# Patient Record
Sex: MALE | Race: WHITE | NOT HISPANIC OR LATINO | Employment: OTHER | URBAN - METROPOLITAN AREA
[De-identification: names, ages, dates, MRNs, and addresses within clinical notes are randomized per-mention and may not be internally consistent; named-entity substitution may affect disease eponyms.]

---

## 2023-09-08 PROBLEM — N52.9 ERECTILE DYSFUNCTION: Status: ACTIVE | Noted: 2023-09-08

## 2023-09-08 PROBLEM — D72.824 BASOPHILIC LEUKOCYTOSIS: Status: ACTIVE | Noted: 2023-09-08

## 2023-09-08 PROBLEM — M51.26 DISPLACEMENT OF LUMBAR INTERVERTEBRAL DISC: Status: ACTIVE | Noted: 2023-09-08

## 2023-09-08 PROBLEM — E55.9 VITAMIN D DEFICIENCY: Status: ACTIVE | Noted: 2023-09-08

## 2023-09-08 PROBLEM — K76.0 HEPATIC STEATOSIS: Status: ACTIVE | Noted: 2023-09-08

## 2023-09-08 PROBLEM — J31.0 CHRONIC RHINITIS: Status: ACTIVE | Noted: 2023-09-08

## 2023-09-08 PROBLEM — K21.9 GASTROESOPHAGEAL REFLUX DISEASE: Status: ACTIVE | Noted: 2023-09-08

## 2023-09-08 PROBLEM — E03.9 HYPOTHYROIDISM: Status: ACTIVE | Noted: 2023-09-08

## 2023-09-08 PROBLEM — M54.16 RADICULOPATHY, LUMBAR REGION: Status: ACTIVE | Noted: 2023-09-08

## 2023-09-08 PROBLEM — M48.062 SPINAL STENOSIS OF LUMBAR REGION WITH NEUROGENIC CLAUDICATION: Status: ACTIVE | Noted: 2023-09-08

## 2023-09-08 PROBLEM — F17.200 SMOKER: Status: ACTIVE | Noted: 2023-09-08

## 2023-09-08 PROBLEM — D72.828 NEUTROPHILIC LEUKOCYTOSIS: Status: ACTIVE | Noted: 2023-09-08

## 2023-09-08 PROBLEM — D72.9 NEUTROPHILIC LEUKOCYTOSIS: Status: ACTIVE | Noted: 2023-09-08

## 2023-09-08 PROBLEM — J44.9 CHRONIC OBSTRUCTIVE PULMONARY DISEASE (MULTI): Status: ACTIVE | Noted: 2023-09-08

## 2023-09-08 PROBLEM — R74.01 TRANSAMINASEMIA: Status: ACTIVE | Noted: 2023-09-08

## 2023-09-08 PROBLEM — D72.19 EOSINOPHILIC LEUKOCYTOSIS: Status: ACTIVE | Noted: 2023-09-08

## 2023-09-08 PROBLEM — J45.909 REACTIVE AIRWAY DISEASE WITHOUT COMPLICATION (HHS-HCC): Status: ACTIVE | Noted: 2023-09-08

## 2023-09-08 PROBLEM — R04.0 EPISTAXIS: Status: ACTIVE | Noted: 2023-09-08

## 2023-09-08 PROBLEM — J43.9: Status: ACTIVE | Noted: 2023-09-08

## 2023-09-08 PROBLEM — M54.12 CERVICAL RADICULITIS: Status: ACTIVE | Noted: 2023-09-08

## 2023-09-08 PROBLEM — E78.00 PURE HYPERCHOLESTEROLEMIA: Status: ACTIVE | Noted: 2023-09-08

## 2023-09-08 RX ORDER — FLUTICASONE PROPIONATE 50 MCG
2 SPRAY, SUSPENSION (ML) NASAL 2 TIMES DAILY
COMMUNITY
End: 2023-11-30 | Stop reason: WASHOUT

## 2023-09-08 RX ORDER — TADALAFIL 20 MG/1
20 TABLET ORAL
COMMUNITY
Start: 2016-03-18 | End: 2024-01-10 | Stop reason: ALTCHOICE

## 2023-09-08 RX ORDER — BUDESONIDE AND FORMOTEROL FUMARATE DIHYDRATE 160; 4.5 UG/1; UG/1
2 AEROSOL RESPIRATORY (INHALATION)
COMMUNITY
Start: 2023-01-23 | End: 2023-11-30 | Stop reason: WASHOUT

## 2023-09-08 RX ORDER — BENZONATATE 200 MG/1
200 CAPSULE ORAL 3 TIMES DAILY PRN
COMMUNITY
End: 2024-01-10 | Stop reason: SDUPTHER

## 2023-09-08 RX ORDER — ALBUTEROL SULFATE 90 UG/1
1-2 AEROSOL, METERED RESPIRATORY (INHALATION)
COMMUNITY
Start: 2020-12-17 | End: 2024-01-10

## 2023-09-08 RX ORDER — SILDENAFIL 100 MG/1
100 TABLET, FILM COATED ORAL DAILY PRN
COMMUNITY
Start: 2023-01-13 | End: 2024-01-10 | Stop reason: SDUPTHER

## 2023-09-08 RX ORDER — CHOLECALCIFEROL (VITAMIN D3) 25 MCG
1 TABLET ORAL DAILY
COMMUNITY

## 2023-09-08 RX ORDER — PROMETHAZINE HYDROCHLORIDE 6.25 MG/5ML
10 SYRUP ORAL EVERY 6 HOURS
COMMUNITY
End: 2023-10-11 | Stop reason: SDUPTHER

## 2023-09-08 RX ORDER — NAPROXEN SODIUM 220 MG
1 TABLET ORAL DAILY PRN
COMMUNITY
End: 2024-04-04 | Stop reason: WASHOUT

## 2023-09-08 RX ORDER — TRETINOIN 0.25 MG/G
CREAM TOPICAL
COMMUNITY
End: 2024-02-09 | Stop reason: WASHOUT

## 2023-09-08 RX ORDER — BENZONATATE 200 MG/1
200 CAPSULE ORAL 2 TIMES DAILY PRN
COMMUNITY
Start: 2023-01-13 | End: 2023-11-30 | Stop reason: WASHOUT

## 2023-09-08 RX ORDER — IBUPROFEN 800 MG/1
800 TABLET ORAL 3 TIMES DAILY PRN
COMMUNITY
End: 2024-04-30 | Stop reason: HOSPADM

## 2023-09-08 RX ORDER — TAVABOROLE TOPICAL SOLUTION, 5% 43.5 MG/ML
1 SOLUTION TOPICAL DAILY
COMMUNITY
End: 2024-01-10 | Stop reason: ALTCHOICE

## 2023-09-08 RX ORDER — ASCORBIC ACID 250 MG
1 TABLET ORAL DAILY
COMMUNITY

## 2023-09-08 RX ORDER — FLUTICASONE FUROATE AND VILANTEROL 100; 25 UG/1; UG/1
1 POWDER RESPIRATORY (INHALATION) DAILY
COMMUNITY
Start: 2023-01-24 | End: 2024-01-10 | Stop reason: SDUPTHER

## 2023-09-08 RX ORDER — FLUTICASONE PROPIONATE 50 MCG
1 SPRAY, SUSPENSION (ML) NASAL DAILY PRN
COMMUNITY
End: 2024-02-09 | Stop reason: WASHOUT

## 2023-09-08 RX ORDER — PROMETHAZINE HYDROCHLORIDE AND PHENYLEPHRINE HYDROCHLORIDE 6.25; 5 MG/5ML; MG/5ML
SYRUP ORAL
COMMUNITY
End: 2024-01-10 | Stop reason: ALTCHOICE

## 2023-09-08 RX ORDER — ACYCLOVIR 50 MG/G
1 OINTMENT TOPICAL
COMMUNITY
End: 2024-02-09 | Stop reason: WASHOUT

## 2023-09-08 RX ORDER — CHOLECALCIFEROL (VITAMIN D3) 125 MCG
CAPSULE ORAL DAILY
COMMUNITY
End: 2023-11-30 | Stop reason: WASHOUT

## 2023-09-08 RX ORDER — SILDENAFIL 100 MG/1
0.5 TABLET, FILM COATED ORAL AS NEEDED
COMMUNITY
End: 2023-11-30 | Stop reason: WASHOUT

## 2023-09-08 RX ORDER — PROMETHAZINE HYDROCHLORIDE 25 MG/ML
1 INJECTION, SOLUTION INTRAMUSCULAR; INTRAVENOUS EVERY 6 HOURS
COMMUNITY
End: 2024-01-10 | Stop reason: SDUPTHER

## 2023-09-08 RX ORDER — ATORVASTATIN CALCIUM 20 MG/1
20 TABLET, FILM COATED ORAL DAILY
COMMUNITY
End: 2024-01-10 | Stop reason: SDUPTHER

## 2023-10-05 NOTE — TELEPHONE ENCOUNTER
Patient came in contact with someone who came in contact with someone who has Hepatitis C. Would like to discuss with clinical staff what he needs to do for testing. Also requesting refill for Promethazine syrup to go to [facility] on [address]. Asked for more than 1 refill.

## 2023-10-05 NOTE — TELEPHONE ENCOUNTER
Patient called back he davian sexual contact with person who tested positive for Hep C and patient has history of fatty liver, would like labs drawn to check on status

## 2023-10-08 DIAGNOSIS — Z20.5 EXPOSURE TO HEPATITIS C: Primary | ICD-10-CM

## 2023-10-08 RX ORDER — PROMETHAZINE HYDROCHLORIDE 6.25 MG/5ML
10 SYRUP ORAL EVERY 6 HOURS
Qty: 120 ML | Refills: 1 | OUTPATIENT
Start: 2023-10-08 | End: 2024-01-06

## 2023-10-09 NOTE — TELEPHONE ENCOUNTER
Patient aware of above, also requesting promethazine syrup for his cough, asking for Rx with  additional refills

## 2023-10-11 DIAGNOSIS — J43.9 CHRONIC EMPHYSEMA SYNDROME (MULTI): Primary | ICD-10-CM

## 2023-10-11 RX ORDER — PROMETHAZINE HYDROCHLORIDE 6.25 MG/5ML
12.5 SYRUP ORAL EVERY 6 HOURS
Qty: 120 ML | Refills: 1 | Status: SHIPPED | OUTPATIENT
Start: 2023-10-11 | End: 2023-11-29

## 2023-10-16 ENCOUNTER — LAB (OUTPATIENT)
Dept: LAB | Facility: LAB | Age: 63
End: 2023-10-16
Payer: MEDICARE

## 2023-10-16 ENCOUNTER — CLINICAL SUPPORT (OUTPATIENT)
Dept: PRIMARY CARE | Facility: CLINIC | Age: 63
End: 2023-10-16
Payer: MEDICARE

## 2023-10-16 VITALS — TEMPERATURE: 97.8 F

## 2023-10-16 DIAGNOSIS — Z20.5 EXPOSURE TO HEPATITIS C: ICD-10-CM

## 2023-10-16 DIAGNOSIS — Z23 ENCOUNTER FOR IMMUNIZATION: ICD-10-CM

## 2023-10-16 LAB
ALBUMIN SERPL-MCNC: 4.7 G/DL (ref 3.5–5)
ALP BLD-CCNC: 109 U/L (ref 35–125)
ALT SERPL-CCNC: 24 U/L (ref 5–40)
AST SERPL-CCNC: 17 U/L (ref 5–40)
BILIRUB DIRECT SERPL-MCNC: <0.2 MG/DL (ref 0–0.2)
BILIRUB SERPL-MCNC: 0.4 MG/DL (ref 0.1–1.2)
PROT SERPL-MCNC: 6.7 G/DL (ref 5.9–7.9)

## 2023-10-16 PROCEDURE — 80076 HEPATIC FUNCTION PANEL: CPT

## 2023-10-16 PROCEDURE — 87522 HEPATITIS C REVRS TRNSCRPJ: CPT

## 2023-10-16 PROCEDURE — 86803 HEPATITIS C AB TEST: CPT

## 2023-10-16 PROCEDURE — G0008 ADMIN INFLUENZA VIRUS VAC: HCPCS | Performed by: INTERNAL MEDICINE

## 2023-10-16 PROCEDURE — 90662 IIV NO PRSV INCREASED AG IM: CPT | Performed by: INTERNAL MEDICINE

## 2023-10-16 PROCEDURE — 36415 COLL VENOUS BLD VENIPUNCTURE: CPT

## 2023-10-17 LAB
HCV AB SER QL: NONREACTIVE
HCV RNA SERPL NAA+PROBE-ACNC: NOT DETECTED K[IU]/ML
HCV RNA SERPL NAA+PROBE-LOG IU: NORMAL {LOG_IU}/ML

## 2023-10-18 ENCOUNTER — TELEPHONE (OUTPATIENT)
Dept: PRIMARY CARE | Facility: CLINIC | Age: 63
End: 2023-10-18

## 2023-10-18 NOTE — TELEPHONE ENCOUNTER
Pt requesting results of recent labs and further instructions if any.  Please advise.  Ph: [phone]

## 2023-10-19 NOTE — TELEPHONE ENCOUNTER
Patient would  like to have lab order to to repeat hep and hepatic panel in 6 months, please send  message back after ordered.

## 2023-11-28 DIAGNOSIS — J43.9 CHRONIC EMPHYSEMA SYNDROME (MULTI): ICD-10-CM

## 2023-11-29 RX ORDER — PROMETHAZINE HYDROCHLORIDE 6.25 MG/5ML
SYRUP ORAL
Qty: 360 ML | Refills: 1 | Status: ON HOLD | OUTPATIENT
Start: 2023-11-29 | End: 2024-04-26

## 2023-11-30 ENCOUNTER — OFFICE VISIT (OUTPATIENT)
Dept: PRIMARY CARE | Facility: CLINIC | Age: 63
End: 2023-11-30
Payer: MEDICARE

## 2023-11-30 VITALS
WEIGHT: 211 LBS | TEMPERATURE: 97.9 F | BODY MASS INDEX: 30.21 KG/M2 | OXYGEN SATURATION: 97 % | HEIGHT: 70 IN | SYSTOLIC BLOOD PRESSURE: 152 MMHG | DIASTOLIC BLOOD PRESSURE: 84 MMHG | HEART RATE: 111 BPM

## 2023-11-30 DIAGNOSIS — Z12.2 ENCOUNTER FOR SCREENING FOR LUNG CANCER: ICD-10-CM

## 2023-11-30 DIAGNOSIS — Z87.891 PERSONAL HISTORY OF NICOTINE DEPENDENCE: ICD-10-CM

## 2023-11-30 DIAGNOSIS — J98.09 BRONCHOSPASTIC AIRWAY DISEASE: Primary | ICD-10-CM

## 2023-11-30 DIAGNOSIS — Z72.0 TOBACCO USE: ICD-10-CM

## 2023-11-30 PROCEDURE — 99213 OFFICE O/P EST LOW 20 MIN: CPT | Performed by: INTERNAL MEDICINE

## 2023-11-30 PROCEDURE — 1126F AMNT PAIN NOTED NONE PRSNT: CPT | Performed by: INTERNAL MEDICINE

## 2023-11-30 RX ORDER — PROMETHAZINE HYDROCHLORIDE AND DEXTROMETHORPHAN HYDROBROMIDE 6.25; 15 MG/5ML; MG/5ML
SYRUP ORAL
COMMUNITY
Start: 2023-07-06 | End: 2024-01-10 | Stop reason: ALTCHOICE

## 2023-11-30 RX ORDER — FAMCICLOVIR 125 MG/1
TABLET ORAL EVERY 12 HOURS
COMMUNITY
End: 2024-01-10 | Stop reason: SDUPTHER

## 2023-11-30 RX ORDER — NAPROXEN 500 MG/1
TABLET, DELAYED RELEASE ORAL
COMMUNITY
Start: 2023-11-23 | End: 2024-02-09 | Stop reason: WASHOUT

## 2023-11-30 RX ORDER — AMOXICILLIN 250 MG/1
250 CAPSULE ORAL DAILY PRN
COMMUNITY
Start: 2023-10-01 | End: 2024-03-21 | Stop reason: ALTCHOICE

## 2023-11-30 RX ORDER — METHYLPREDNISOLONE 4 MG/1
TABLET ORAL
Qty: 21 TABLET | Refills: 0 | Status: SHIPPED | OUTPATIENT
Start: 2023-11-30 | End: 2023-12-07

## 2023-11-30 ASSESSMENT — PATIENT HEALTH QUESTIONNAIRE - PHQ9
1. LITTLE INTEREST OR PLEASURE IN DOING THINGS: NOT AT ALL
SUM OF ALL RESPONSES TO PHQ9 QUESTIONS 1 AND 2: 0
2. FEELING DOWN, DEPRESSED OR HOPELESS: NOT AT ALL

## 2023-11-30 ASSESSMENT — PAIN SCALES - GENERAL: PAINLEVEL: 0-NO PAIN

## 2023-11-30 ASSESSMENT — ENCOUNTER SYMPTOMS
LOSS OF SENSATION IN FEET: 0
DEPRESSION: 0
OCCASIONAL FEELINGS OF UNSTEADINESS: 0

## 2023-11-30 NOTE — PROGRESS NOTES
[facility]: [facility]  PROGRESS NOTE      Gregor Finney is a 63 y.o. male that is presenting today for Cough.    Assessment/Plan   Diagnoses and all orders for this visit:  Bronchospastic airway disease  -     methylPREDNISolone (Medrol Dospak) 4 mg tablets; Take as directed on package.  Tobacco use  -     CT lung screening low dose; Future  Encounter for screening for lung cancer  -     CT lung screening low dose; Future  Personal history of nicotine dependence  -     CT lung screening low dose; Future  Subjective   HPIPatient is here fro 4 weeks H/O cough and his wife who started before him with the URI and lingering cough was Dxed with RSV.  Review of Systems   All pertinent POSITIVES as noted per HPI.  All other systems have been reviewed and are NEGATIVE and /or Noncontributory to this patient current visit or complaint.  Objective   Vitals:    11/30/23 0948   BP: 152/84   Pulse: (!) 111   Temp: 36.6 °C (97.9 °F)   SpO2: 97%      Body mass index is 30.28 kg/m².  Physical Exam  Vitals and nursing note reviewed.   Constitutional:       Appearance: Normal appearance.   HENT:      Head: Normocephalic and atraumatic.   Neck:      Vascular: No carotid bruit.   Cardiovascular:      Rate and Rhythm: Normal rate and regular rhythm.      Pulses: Normal pulses.      Heart sounds: Normal heart sounds.   Pulmonary:      Effort: Pulmonary effort is normal. No respiratory distress.      Breath sounds: No stridor. Wheezing (Expiratory) and rhonchi (leared with coughing) present. No rales.   Chest:      Chest wall: No tenderness.   Abdominal:      General: Abdomen is flat. Bowel sounds are normal.      Palpations: Abdomen is soft.   Musculoskeletal:         General: No swelling. Normal range of motion.      Cervical back: Neck supple.   Lymphadenopathy:      Cervical: No cervical adenopathy.   Skin:     General: Skin is warm and dry.   Neurological:      Mental Status: He is alert.      Cranial  "Nerves: Cranial nerve deficit present.   Psychiatric:         Mood and Affect: Mood normal.     Diagnostic Results   Lab Results   Component Value Date    GLUCOSE 108 (H) 07/11/2023    CALCIUM 9.6 07/11/2023     07/11/2023    K 4.6 07/11/2023    CO2 23 (L) 07/11/2023     07/11/2023    BUN 21 07/11/2023    CREATININE 1.2 07/11/2023     Lab Results   Component Value Date    ALT 24 10/16/2023    AST 17 10/16/2023    ALKPHOS 109 10/16/2023    BILITOT 0.4 10/16/2023     Lab Results   Component Value Date    WBC 18.2 (H) 03/09/2023    HGB 15.6 03/09/2023    HCT 46.5 03/09/2023    MCV 95 03/09/2023     03/09/2023     Lab Results   Component Value Date    CHOL 150 07/11/2023     Lab Results   Component Value Date    HDL 33 (L) 07/11/2023     Lab Results   Component Value Date    LDLCALC 85 07/11/2023     Lab Results   Component Value Date    TRIG 160 (H) 07/11/2023     No components found for: \"CHOLHDL\"  Lab Results   Component Value Date    HGBA1C 5.8 07/11/2023     Other labs not included in the list above were reviewed either before or during this encounter.    History    Past Medical History:   Diagnosis Date    Personal history of other endocrine, nutritional and metabolic disease     History of hypercholesterolemia     Past Surgical History:   Procedure Laterality Date    OTHER SURGICAL HISTORY  09/16/2019    Nasal septoplasty    OTHER SURGICAL HISTORY  09/16/2019    Knee surgery    OTHER SURGICAL HISTORY  09/16/2019    Elbow surgery     Family History   Problem Relation Name Age of Onset    COPD Mother      Emphysema Mother      Stroke Other      Diabetes Other      Cancer Other      Heart attack Other       Social History     Socioeconomic History    Marital status:      Spouse name: Not on file    Number of children: Not on file    Years of education: Not on file    Highest education level: Not on file   Occupational History    Not on file   Tobacco Use    Smoking status: Not on file    " Smokeless tobacco: Current   Substance and Sexual Activity    Alcohol use: Not on file    Drug use: Never    Sexual activity: Never   Other Topics Concern    Not on file   Social History Narrative    Not on file     Social Determinants of Health     Financial Resource Strain: Not on file   Food Insecurity: Not on file   Transportation Needs: Not on file   Physical Activity: Not on file   Stress: Not on file   Social Connections: Not on file   Intimate Partner Violence: Not on file   Housing Stability: Not on file     Allergies   Allergen Reactions    Levofloxacin Other     foot, knee pain    Sulfa (Sulfonamide Antibiotics) Rash    Tetracycline Rash     Current Outpatient Medications on File Prior to Visit   Medication Sig Dispense Refill    acyclovir (Zovirax) 5 % ointment Apply 1 Application topically 6 times a day. 1 application every 3 hours Externally Six times a day      albuterol 90 mcg/actuation inhaler Inhale 1-2 puffs.  INHALE 1 TO 2 PUFFS EVERY 4 TO 6 HOURS AS NEEDED.      amoxicillin (Amoxil) 250 mg capsule Take 1 capsule (250 mg) by mouth once daily as needed.      ascorbic acid (Vitamin C) 250 mg tablet Take 1 tablet (250 mg) by mouth once daily. TAKE 1 TABLET DAILY.      atorvastatin (Lipitor) 20 mg tablet Take 1 tablet (20 mg) by mouth once daily.  1 tablet Orally Once a day for 90 day(s)      benzonatate (Tessalon) 200 mg capsule Take 1 capsule (200 mg) by mouth 3 times a day as needed.  TAKE 1 CAPSULE 3 TIMES DAILY AS NEEDED.      calcium carb-vitamin D3-vit K2 600 mg-1,000 unit-90 mcg tablet 1 capsule once every 24 hours.      cholecalciferol (Vitamin D3) 25 MCG (1000 UT) tablet Take 1 capsule by mouth once daily.  1 capsule Orally Once a day for 30 day(s)      EC-Naproxen 500 mg EC tablet       famciclovir (Famvir) 125 mg tablet every 12 hours.      fluticasone (Flonase) 50 mcg/actuation nasal spray Administer 1 spray into each nostril once daily as needed. 1 spray in each nostril Nasally Once a  day as needed for 90 days      fluticasone furoate-vilanteroL (Breo Ellipta) 100-25 mcg/dose inhaler Inhale 1 puff once daily.  1 puff Inhalation Once a day for 90 days      ibuprofen 800 mg tablet Take 1 tablet (800 mg) by mouth 3 times a day as needed.  1 tablet Orally Three times a day as needed( not every day) for 30 days      naproxen sodium (Aleve) 220 mg tablet Take 1 tablet (220 mg) by mouth once daily as needed.  ONE TABLET DAILY PRN      promethazine-DM (Phenergan-DM) 6.25-15 mg/5 mL syrup TAKE 5-10 ML AS NEEDED ORALLY EVERY 6 HOURS      promethazine-phenylephrine (Phenergan-VC) 6.25-5 mg/5 mL syrup Take by mouth.      sildenafil (Viagra) 100 mg tablet Take 1 tablet (100 mg) by mouth once daily as needed.  1 tablet as needed Orally Once a day as needed for 90 day(s)      ZINC GLUCONATE ORAL Take 1 tablet by mouth once daily.  TAKE 1 TABLET DAILY.      [DISCONTINUED] budesonide-formoteroL (Symbicort) 160-4.5 mcg/actuation inhaler Inhale 2 puffs 2 times a day.  2 puffs Inhalation Twice a day for 90 days      [DISCONTINUED] cholecalciferol (Vitamin D-3) 125 MCG (5000 UT) capsule Take by mouth once daily.  one capsule daily      [DISCONTINUED] MULTIVITAMIN ORAL Take by mouth.  as directed Orally      [DISCONTINUED] sildenafil (Viagra) 100 mg tablet Take 0.5 tablets (50 mg) by mouth if needed.  1/2 tablet PRN      [DISCONTINUED] vitamin E acetate (VITAMIN E ORAL) Take 1 capsule by mouth early in the morning..      alpha lipoic acid (LIPOIC ACID ORAL) Take 1 capsule by mouth early in the morning..  ONE DOSEAGE DAILY      promethazine (Phenergan) 25 mg/mL injection Inject 1 mL (25 mg) into the shoulder, thigh, or buttocks every 6 hours.  1 mL as needed Injection every 6 hrs      promethazine (Phenergan) 6.25 mg/5 mL syrup TAKE 1 MILLILITER BY MOUTH EVERY 6 HOURS AS NEEDED 360 mL 1    tadalafil (Cialis) 20 mg tablet Take 1 tablet (20 mg) by mouth.  1 tablet Orally every 36 hrs prn for 30 day(s)      tavaborole 5 %  solution with applicator 1 Application early in the morning..  1 application Externally Once a day      tretinoin (Retin-A) 0.025 % cream Retin-A 0.025 % External Cream   Refills: 0       Active      triamcinolone acetonide (KENALOG) 40 mg/mL kit as directed Combination      [DISCONTINUED] benzonatate (Tessalon) 200 mg capsule Take 1 capsule (200 mg) by mouth 2 times a day as needed.  1 capsule Orally Two times a day for 90 day(s)      [DISCONTINUED] fluticasone (Flonase) 50 mcg/actuation nasal spray Administer 2 sprays into each nostril 2 times a day.  USE 2 SPRAYS IN EACH NOSTRIL TWICE DAILY.      [DISCONTINUED] promethazine (Phenergan) 6.25 mg/5 mL syrup Take 10 mL (12.5 mg) by mouth every 6 hours. 120 mL 1     No current facility-administered medications on file prior to visit.     Immunization History   Administered Date(s) Administered    DTaP vaccine, pediatric  (INFANRIX) 03/28/2012    Flu vaccine (IIV4), preservative free *Check age/dose* 10/12/2020, 10/06/2021, 09/20/2022    Flu vaccine, quadrivalent, high-dose, preservative free, age 65y+ (FLUZONE) 10/16/2023    Hepatitis A vaccine, age 19 years and greater (HAVRIX) 09/03/2021    Hepatitis B vaccine, adult (RECOMBIVAX, ENGERIX) 09/03/2021, 11/10/2021    Moderna COVID-19 vaccine, bivalent, blue cap/gray label *Check age/dose* 11/18/2022    Moderna SARS-CoV-2 Vaccination 04/07/2021, 05/07/2021, 01/17/2022, 07/16/2022    Pneumococcal conjugate vaccine, 13-valent (PREVNAR 13) 11/19/2019    Pneumococcal polysaccharide vaccine, 23-valent, age 2 years and older (PNEUMOVAX 23) 10/02/2018    Td vaccine, age 7 years and older (TDVAX) 01/01/2000    Zoster vaccine, recombinant, adult (SHINGRIX) 11/19/2019     Patient's medical history was reviewed and updated either before or during this encounter.       Pardeep Max MD

## 2023-12-04 ENCOUNTER — TELEPHONE (OUTPATIENT)
Dept: PRIMARY CARE | Facility: CLINIC | Age: 63
End: 2023-12-04
Payer: COMMERCIAL

## 2023-12-04 DIAGNOSIS — K64.4 INFLAMED EXTERNAL HEMORRHOID: Primary | ICD-10-CM

## 2023-12-04 RX ORDER — PRAMOXINE HYDROCHLORIDE HYDROCORTISONE ACETATE 100; 100 MG/10G; MG/10G
1 AEROSOL, FOAM TOPICAL 3 TIMES DAILY
Qty: 45 G | Refills: 0 | Status: SHIPPED | OUTPATIENT
Start: 2023-12-04 | End: 2024-01-10 | Stop reason: ALTCHOICE

## 2023-12-04 NOTE — TELEPHONE ENCOUNTER
Pt went to  cream (proctofoam) but states it cost $990.  Asking if there is something similar that would be less expensive.  Please advise.    Send to [facility] [address], [place]

## 2023-12-04 NOTE — TELEPHONE ENCOUNTER
Pt states you had wanted a call re: severely inflamed hemorrhoidal tissue.  States you would Rx a cream if not better and pt states not better.  Would like cream sent to [facility] [address], [place].  Please advise.  Ph:  [phone]

## 2023-12-05 DIAGNOSIS — K64.8 INFLAMED INTERNAL HEMORRHOID: Primary | ICD-10-CM

## 2023-12-05 DIAGNOSIS — K64.4 INFLAMED EXTERNAL HEMORRHOID: ICD-10-CM

## 2023-12-05 RX ORDER — HYDROCORTISONE ACETATE 25 MG/1
25 SUPPOSITORY RECTAL 2 TIMES DAILY PRN
Qty: 30 SUPPOSITORY | Refills: 0 | Status: SHIPPED | OUTPATIENT
Start: 2023-12-05 | End: 2024-01-10 | Stop reason: ALTCHOICE

## 2023-12-05 NOTE — TELEPHONE ENCOUNTER
Can you assist with pricing and other options?  Pt called again and is very uncomfortable.  Please advise.

## 2023-12-07 ENCOUNTER — OFFICE VISIT (OUTPATIENT)
Dept: PRIMARY CARE | Facility: CLINIC | Age: 63
End: 2023-12-07
Payer: MEDICARE

## 2023-12-07 VITALS
SYSTOLIC BLOOD PRESSURE: 124 MMHG | BODY MASS INDEX: 29.7 KG/M2 | HEART RATE: 117 BPM | DIASTOLIC BLOOD PRESSURE: 80 MMHG | WEIGHT: 207 LBS | OXYGEN SATURATION: 95 %

## 2023-12-07 DIAGNOSIS — K64.4 INFLAMED EXTERNAL HEMORRHOID: Primary | ICD-10-CM

## 2023-12-07 PROCEDURE — 4004F PT TOBACCO SCREEN RCVD TLK: CPT | Performed by: INTERNAL MEDICINE

## 2023-12-07 PROCEDURE — 99213 OFFICE O/P EST LOW 20 MIN: CPT | Performed by: INTERNAL MEDICINE

## 2023-12-07 PROCEDURE — 1159F MED LIST DOCD IN RCRD: CPT | Performed by: INTERNAL MEDICINE

## 2023-12-07 PROCEDURE — 1125F AMNT PAIN NOTED PAIN PRSNT: CPT | Performed by: INTERNAL MEDICINE

## 2023-12-07 ASSESSMENT — PATIENT HEALTH QUESTIONNAIRE - PHQ9
1. LITTLE INTEREST OR PLEASURE IN DOING THINGS: NOT AT ALL
2. FEELING DOWN, DEPRESSED OR HOPELESS: NOT AT ALL
SUM OF ALL RESPONSES TO PHQ9 QUESTIONS 1 AND 2: 0

## 2023-12-07 ASSESSMENT — ENCOUNTER SYMPTOMS
OCCASIONAL FEELINGS OF UNSTEADINESS: 0
DEPRESSION: 0
LOSS OF SENSATION IN FEET: 1

## 2023-12-07 ASSESSMENT — PAIN SCALES - GENERAL: PAINLEVEL: 6

## 2023-12-07 NOTE — PROGRESS NOTES
[facility]: [facility]  PROGRESS NOTE      Gregor Finney is a 63 y.o. male that is presenting today for Follow-up (Hemorrhoids ).    Assessment/Plan   Diagnoses and all orders for this visit:  Inflamed external huge hemorrhoid    Local Tx is ineffective  -     Referral to Colorectal Surgery; Future  Subjective   Patient is here fro [facility], been having problem with hs hemorrhoid that started couple of weeks ago after bad constipation followed by diarrhea. All OTC and prescription Tx been ineffective and he is in extreme pain denies any bleeding.    Review of Systems   All pertinent POSITIVES as noted per HPI.  All other systems have been reviewed and are NEGATIVE and /or Noncontributory to this patient current visit or complaint.  Objective   Vitals:    12/07/23 1628   BP: 124/80   Pulse: (!) 117   SpO2: 95%      Body mass index is 29.7 kg/m².  Physical Exam  Vitals and nursing note reviewed.   Constitutional:       Appearance: Normal appearance.   HENT:      Head: Normocephalic and atraumatic.   Neck:      Vascular: No carotid bruit.   Cardiovascular:      Rate and Rhythm: Normal rate and regular rhythm.      Pulses: Normal pulses.      Heart sounds: Normal heart sounds.   Pulmonary:      Effort: Pulmonary effort is normal.      Breath sounds: Normal breath sounds.   Abdominal:      General: Abdomen is flat. Bowel sounds are normal.      Palpations: Abdomen is soft.   Genitourinary:     Comments: Extremely large and inflamed external hemorrhoid , no internal   Musculoskeletal:         General: No swelling. Normal range of motion.      Cervical back: Neck supple.   Lymphadenopathy:      Cervical: No cervical adenopathy.   Skin:     General: Skin is warm and dry.   Neurological:      Mental Status: He is alert.   Psychiatric:         Mood and Affect: Mood normal.       Diagnostic Results   Lab Results   Component Value Date    GLUCOSE 108 (H) 07/11/2023    CALCIUM 9.6 07/11/2023      "07/11/2023    K 4.6 07/11/2023    CO2 23 (L) 07/11/2023     07/11/2023    BUN 21 07/11/2023    CREATININE 1.2 07/11/2023     Lab Results   Component Value Date    ALT 24 10/16/2023    AST 17 10/16/2023    ALKPHOS 109 10/16/2023    BILITOT 0.4 10/16/2023     Lab Results   Component Value Date    WBC 18.2 (H) 03/09/2023    HGB 15.6 03/09/2023    HCT 46.5 03/09/2023    MCV 95 03/09/2023     03/09/2023     Lab Results   Component Value Date    CHOL 150 07/11/2023     Lab Results   Component Value Date    HDL 33 (L) 07/11/2023     Lab Results   Component Value Date    LDLCALC 85 07/11/2023     Lab Results   Component Value Date    TRIG 160 (H) 07/11/2023     No components found for: \"CHOLHDL\"  Lab Results   Component Value Date    HGBA1C 5.8 07/11/2023     Other labs not included in the list above were reviewed either before or during this encounter.    History    Past Medical History:   Diagnosis Date    Personal history of other endocrine, nutritional and metabolic disease     History of hypercholesterolemia     Past Surgical History:   Procedure Laterality Date    OTHER SURGICAL HISTORY  09/16/2019    Nasal septoplasty    OTHER SURGICAL HISTORY  09/16/2019    Knee surgery    OTHER SURGICAL HISTORY  09/16/2019    Elbow surgery     Family History   Problem Relation Name Age of Onset    COPD Mother      Emphysema Mother      Stroke Other      Diabetes Other      Cancer Other      Heart attack Other       Social History     Socioeconomic History    Marital status:      Spouse name: Not on file    Number of children: Not on file    Years of education: Not on file    Highest education level: Not on file   Occupational History    Not on file   Tobacco Use    Smoking status: Every Day     Packs/day: 1     Types: Cigarettes    Smokeless tobacco: Never   Vaping Use    Vaping Use: Never used   Substance and Sexual Activity    Alcohol use: Yes     Comment: sometimes    Drug use: Never    Sexual activity: " Never   Other Topics Concern    Not on file   Social History Narrative    Not on file     Social Determinants of Health     Financial Resource Strain: Not on file   Food Insecurity: Not on file   Transportation Needs: Not on file   Physical Activity: Not on file   Stress: Not on file   Social Connections: Not on file   Intimate Partner Violence: Not on file   Housing Stability: Not on file     Allergies   Allergen Reactions    Levofloxacin Other     foot, knee pain    Sulfa (Sulfonamide Antibiotics) Rash    Tetracycline Rash     Current Outpatient Medications on File Prior to Visit   Medication Sig Dispense Refill    acyclovir (Zovirax) 5 % ointment Apply 1 Application topically 6 times a day. 1 application every 3 hours Externally Six times a day      albuterol 90 mcg/actuation inhaler Inhale 1-2 puffs.  INHALE 1 TO 2 PUFFS EVERY 4 TO 6 HOURS AS NEEDED.      alpha lipoic acid (LIPOIC ACID ORAL) Take 1 capsule by mouth early in the morning..  ONE DOSEAGE DAILY      amoxicillin (Amoxil) 250 mg capsule Take 1 capsule (250 mg) by mouth once daily as needed.      ascorbic acid (Vitamin C) 250 mg tablet Take 1 tablet (250 mg) by mouth once daily. TAKE 1 TABLET DAILY.      atorvastatin (Lipitor) 20 mg tablet Take 1 tablet (20 mg) by mouth once daily.  1 tablet Orally Once a day for 90 day(s)      benzonatate (Tessalon) 200 mg capsule Take 1 capsule (200 mg) by mouth 3 times a day as needed.  TAKE 1 CAPSULE 3 TIMES DAILY AS NEEDED.      calcium carb-vitamin D3-vit K2 600 mg-1,000 unit-90 mcg tablet 1 capsule once every 24 hours.      cholecalciferol (Vitamin D3) 25 MCG (1000 UT) tablet Take 1 capsule by mouth once daily.  1 capsule Orally Once a day for 30 day(s)      EC-Naproxen 500 mg EC tablet       famciclovir (Famvir) 125 mg tablet every 12 hours.      fluticasone (Flonase) 50 mcg/actuation nasal spray Administer 1 spray into each nostril once daily as needed. 1 spray in each nostril Nasally Once a day as needed for  90 days      fluticasone furoate-vilanteroL (Breo Ellipta) 100-25 mcg/dose inhaler Inhale 1 puff once daily.  1 puff Inhalation Once a day for 90 days      hydrocortisone (Anusol-HC) 25 mg suppository Insert 1 suppository (25 mg) into the rectum 2 times a day as needed for hemorrhoids. 30 suppository 0    hydrocortisone-pramoxine (Proctofoam HC) rectal foam Insert 1 applicator into the rectum 3 times a day. 45 g 0    ibuprofen 800 mg tablet Take 1 tablet (800 mg) by mouth 3 times a day as needed.  1 tablet Orally Three times a day as needed( not every day) for 30 days      methylPREDNISolone (Medrol Dospak) 4 mg tablets Take as directed on package. 21 tablet 0    naproxen sodium (Aleve) 220 mg tablet Take 1 tablet (220 mg) by mouth once daily as needed.  ONE TABLET DAILY PRN      promethazine (Phenergan) 25 mg/mL injection Inject 1 mL (25 mg) into the muscle every 6 hours.  1 mL as needed Injection every 6 hrs      promethazine (Phenergan) 6.25 mg/5 mL syrup TAKE 1 MILLILITER BY MOUTH EVERY 6 HOURS AS NEEDED 360 mL 1    promethazine-DM (Phenergan-DM) 6.25-15 mg/5 mL syrup TAKE 5-10 ML AS NEEDED ORALLY EVERY 6 HOURS      promethazine-phenylephrine (Phenergan-VC) 6.25-5 mg/5 mL syrup Take by mouth.      sildenafil (Viagra) 100 mg tablet Take 1 tablet (100 mg) by mouth once daily as needed.  1 tablet as needed Orally Once a day as needed for 90 day(s)      tadalafil (Cialis) 20 mg tablet Take 1 tablet (20 mg) by mouth.  1 tablet Orally every 36 hrs prn for 30 day(s)      tavaborole 5 % solution with applicator 1 Application early in the morning..  1 application Externally Once a day      tretinoin (Retin-A) 0.025 % cream Retin-A 0.025 % External Cream   Refills: 0       Active      triamcinolone acetonide (KENALOG) 40 mg/mL kit as directed Combination      ZINC GLUCONATE ORAL Take 1 tablet by mouth once daily.  TAKE 1 TABLET DAILY.       No current facility-administered medications on file prior to visit.      Immunization History   Administered Date(s) Administered    DTaP vaccine, pediatric  (INFANRIX) 03/28/2012    Flu vaccine (IIV4), preservative free *Check age/dose* 10/12/2020, 10/06/2021, 09/20/2022    Flu vaccine, quadrivalent, high-dose, preservative free, age 65y+ (FLUZONE) 10/16/2023    Hepatitis A vaccine, age 19 years and greater (HAVRIX) 09/03/2021    Hepatitis B vaccine, adult (RECOMBIVAX, ENGERIX) 09/03/2021, 11/10/2021    Moderna COVID-19 vaccine, bivalent, blue cap/gray label *Check age/dose* 11/18/2022    Moderna SARS-CoV-2 Vaccination 04/07/2021, 05/07/2021, 01/17/2022, 07/16/2022    Pneumococcal conjugate vaccine, 13-valent (PREVNAR 13) 11/19/2019    Pneumococcal polysaccharide vaccine, 23-valent, age 2 years and older (PNEUMOVAX 23) 10/02/2018    Td vaccine, age 7 years and older (TDVAX) 01/01/2000    Zoster vaccine, recombinant, adult (SHINGRIX) 11/19/2019     Patient's medical history was reviewed and updated either before or during this encounter.       Pardeep Max MD

## 2023-12-15 ENCOUNTER — OFFICE VISIT (OUTPATIENT)
Dept: SURGERY | Facility: CLINIC | Age: 63
End: 2023-12-15
Payer: MEDICARE

## 2023-12-15 VITALS
RESPIRATION RATE: 17 BRPM | BODY MASS INDEX: 29.63 KG/M2 | SYSTOLIC BLOOD PRESSURE: 147 MMHG | WEIGHT: 207 LBS | DIASTOLIC BLOOD PRESSURE: 79 MMHG | TEMPERATURE: 98.1 F | HEIGHT: 70 IN | HEART RATE: 116 BPM

## 2023-12-15 DIAGNOSIS — K64.5 THROMBOSED EXTERNAL HEMORRHOID: Primary | ICD-10-CM

## 2023-12-15 DIAGNOSIS — K64.4 INFLAMED EXTERNAL HEMORRHOID: ICD-10-CM

## 2023-12-15 PROCEDURE — 1126F AMNT PAIN NOTED NONE PRSNT: CPT | Performed by: SURGERY

## 2023-12-15 PROCEDURE — 1159F MED LIST DOCD IN RCRD: CPT | Performed by: SURGERY

## 2023-12-15 PROCEDURE — 99203 OFFICE O/P NEW LOW 30 MIN: CPT | Performed by: SURGERY

## 2023-12-15 PROCEDURE — 99213 OFFICE O/P EST LOW 20 MIN: CPT | Performed by: SURGERY

## 2023-12-15 PROCEDURE — 4004F PT TOBACCO SCREEN RCVD TLK: CPT | Performed by: SURGERY

## 2023-12-15 ASSESSMENT — PATIENT HEALTH QUESTIONNAIRE - PHQ9
2. FEELING DOWN, DEPRESSED OR HOPELESS: NOT AT ALL
1. LITTLE INTEREST OR PLEASURE IN DOING THINGS: NOT AT ALL
SUM OF ALL RESPONSES TO PHQ9 QUESTIONS 1 & 2: 0

## 2023-12-15 ASSESSMENT — LIFESTYLE VARIABLES
HOW OFTEN DO YOU HAVE A DRINK CONTAINING ALCOHOL: MONTHLY OR LESS
HOW OFTEN DO YOU HAVE SIX OR MORE DRINKS ON ONE OCCASION: NEVER
AUDIT-C TOTAL SCORE: 1
SKIP TO QUESTIONS 9-10: 1
HOW MANY STANDARD DRINKS CONTAINING ALCOHOL DO YOU HAVE ON A TYPICAL DAY: 1 OR 2

## 2023-12-15 ASSESSMENT — PAIN SCALES - GENERAL: PAINLEVEL: 0-NO PAIN

## 2023-12-15 ASSESSMENT — COLUMBIA-SUICIDE SEVERITY RATING SCALE - C-SSRS
6. HAVE YOU EVER DONE ANYTHING, STARTED TO DO ANYTHING, OR PREPARED TO DO ANYTHING TO END YOUR LIFE?: NO
1. IN THE PAST MONTH, HAVE YOU WISHED YOU WERE DEAD OR WISHED YOU COULD GO TO SLEEP AND NOT WAKE UP?: NO
2. HAVE YOU ACTUALLY HAD ANY THOUGHTS OF KILLING YOURSELF?: NO

## 2023-12-15 ASSESSMENT — ENCOUNTER SYMPTOMS
DEPRESSION: 0
LOSS OF SENSATION IN FEET: 0
OCCASIONAL FEELINGS OF UNSTEADINESS: 0

## 2023-12-15 NOTE — PROGRESS NOTES
[facility]: GENERAL SURGERY  PROGRESS NOTE      Gregor Finney is a 63 y.o. male that is presenting today for New Patient Visit (Dr. Max referred for external hemorrhoids).    ASSESSMENT / PLAN:  Diagnoses and all orders for this visit:  Thrombosed external hemorrhoid  Present for a little over 1 week and slowly improving.  Would not benefit from excision in the office today.  Discussed fiber, sitz bath's, topical nitroglycerin to help expedite resolution.  Patient Active Problem List   Diagnosis    Chronic rhinitis    Cervical radiculitis    Displacement of lumbar intervertebral disc    Basophilic leukocytosis    Eosinophilic leukocytosis    Epistaxis    Erectile dysfunction    Gastroesophageal reflux disease    Hepatic steatosis    Hypothyroidism    Neutrophilic leukocytosis    Chronic obstructive pulmonary disease (CMS/HCC)    Chronic emphysema syndrome (CMS/HCC)    Pure hypercholesterolemia    Reactive airway disease without complication    Smoker    Radiculopathy, lumbar region    Spinal stenosis of lumbar region with neurogenic claudication    Transaminasemia    Vitamin D deficiency    Thrombosed external hemorrhoid     Subjective   Symptoms started a few days before pcp visit, slowly resolving but worsened with enema. Has had blood in stool in past but not recent. Last scope about 6 years ago.  Feels a fullness and pressure near the anus.  Review of Systems   All other systems reviewed and are negative.     Objective   Vitals:    12/15/23 1137   BP: 147/79   Pulse: (!) 116   Resp: 17   Temp: 36.7 °C (98.1 °F)      Physical Exam  Constitutional:       Appearance: Normal appearance.   HENT:      Head: Normocephalic.   Eyes:      Pupils: Pupils are equal, round, and reactive to light.   Cardiovascular:      Rate and Rhythm: Normal rate.   Pulmonary:      Effort: Pulmonary effort is normal.   Abdominal:      General: Abdomen is flat. Bowel sounds are normal.      Palpations: Abdomen is soft.  "  Genitourinary:     Comments: The patient was placed in prone Kraske position. Perianal skin was examined without abnormality.  Approximately 3 cm thrombosed external hemorrhoid in the left posterior quadrant feels relatively soft on exam.  Digital exam deferred.  Skin:     General: Skin is warm.   Neurological:      General: No focal deficit present.      Mental Status: He is alert.         Diagnostic Results   Lab Results   Component Value Date    GLUCOSE 108 (H) 07/11/2023    CALCIUM 9.6 07/11/2023     07/11/2023    K 4.6 07/11/2023    CO2 23 (L) 07/11/2023     07/11/2023    BUN 21 07/11/2023    CREATININE 1.2 07/11/2023     Lab Results   Component Value Date    ALT 24 10/16/2023    AST 17 10/16/2023    ALKPHOS 109 10/16/2023    BILITOT 0.4 10/16/2023     Lab Results   Component Value Date    WBC 18.2 (H) 03/09/2023    HGB 15.6 03/09/2023    HCT 46.5 03/09/2023    MCV 95 03/09/2023     03/09/2023     Lab Results   Component Value Date    CHOL 150 07/11/2023     Lab Results   Component Value Date    HDL 33 (L) 07/11/2023     Lab Results   Component Value Date    LDLCALC 85 07/11/2023     Lab Results   Component Value Date    TRIG 160 (H) 07/11/2023     No components found for: \"CHOLHDL\"  Lab Results   Component Value Date    HGBA1C 5.8 07/11/2023     Other labs not included in the list above were reviewed either before or during this encounter.    History    Past Medical History:   Diagnosis Date    Personal history of other endocrine, nutritional and metabolic disease     History of hypercholesterolemia     Past Surgical History:   Procedure Laterality Date    OTHER SURGICAL HISTORY  09/16/2019    Nasal septoplasty    OTHER SURGICAL HISTORY  09/16/2019    Knee surgery    OTHER SURGICAL HISTORY  09/16/2019    Elbow surgery     Family History   Problem Relation Name Age of Onset    COPD Mother      Emphysema Mother      Stroke Other      Diabetes Other      Cancer Other      Heart attack Other   "     Allergies   Allergen Reactions    Levofloxacin Other     foot, knee pain    Sulfa (Sulfonamide Antibiotics) Rash    Tetracycline Rash     Current Outpatient Medications on File Prior to Visit   Medication Sig Dispense Refill    acyclovir (Zovirax) 5 % ointment Apply 1 Application topically 6 times a day. 1 application every 3 hours Externally Six times a day      albuterol 90 mcg/actuation inhaler Inhale 1-2 puffs.  INHALE 1 TO 2 PUFFS EVERY 4 TO 6 HOURS AS NEEDED.      alpha lipoic acid (LIPOIC ACID ORAL) Take 1 capsule by mouth early in the morning..  ONE DOSEAGE DAILY      amoxicillin (Amoxil) 250 mg capsule Take 1 capsule (250 mg) by mouth once daily as needed.      ascorbic acid (Vitamin C) 250 mg tablet Take 1 tablet (250 mg) by mouth once daily. TAKE 1 TABLET DAILY.      atorvastatin (Lipitor) 20 mg tablet Take 1 tablet (20 mg) by mouth once daily.  1 tablet Orally Once a day for 90 day(s)      benzonatate (Tessalon) 200 mg capsule Take 1 capsule (200 mg) by mouth 3 times a day as needed.  TAKE 1 CAPSULE 3 TIMES DAILY AS NEEDED.      calcium carb-vitamin D3-vit K2 600 mg-1,000 unit-90 mcg tablet 1 capsule once every 24 hours.      cholecalciferol (Vitamin D3) 25 MCG (1000 UT) tablet Take 1 capsule by mouth once daily.  1 capsule Orally Once a day for 30 day(s)      EC-Naproxen 500 mg EC tablet       famciclovir (Famvir) 125 mg tablet every 12 hours.      fluticasone (Flonase) 50 mcg/actuation nasal spray Administer 1 spray into each nostril once daily as needed. 1 spray in each nostril Nasally Once a day as needed for 90 days      fluticasone furoate-vilanteroL (Breo Ellipta) 100-25 mcg/dose inhaler Inhale 1 puff once daily.  1 puff Inhalation Once a day for 90 days      hydrocortisone (Anusol-HC) 25 mg suppository Insert 1 suppository (25 mg) into the rectum 2 times a day as needed for hemorrhoids. 30 suppository 0    hydrocortisone-pramoxine (Proctofoam HC) rectal foam Insert 1 applicator into the  rectum 3 times a day. 45 g 0    ibuprofen 800 mg tablet Take 1 tablet (800 mg) by mouth 3 times a day as needed.  1 tablet Orally Three times a day as needed( not every day) for 30 days      naproxen sodium (Aleve) 220 mg tablet Take 1 tablet (220 mg) by mouth once daily as needed.  ONE TABLET DAILY PRN      promethazine (Phenergan) 25 mg/mL injection Inject 1 mL (25 mg) into the muscle every 6 hours.  1 mL as needed Injection every 6 hrs      promethazine (Phenergan) 6.25 mg/5 mL syrup TAKE 1 MILLILITER BY MOUTH EVERY 6 HOURS AS NEEDED 360 mL 1    promethazine-DM (Phenergan-DM) 6.25-15 mg/5 mL syrup TAKE 5-10 ML AS NEEDED ORALLY EVERY 6 HOURS      promethazine-phenylephrine (Phenergan-VC) 6.25-5 mg/5 mL syrup Take by mouth.      sildenafil (Viagra) 100 mg tablet Take 1 tablet (100 mg) by mouth once daily as needed.  1 tablet as needed Orally Once a day as needed for 90 day(s)      tadalafil (Cialis) 20 mg tablet Take 1 tablet (20 mg) by mouth.  1 tablet Orally every 36 hrs prn for 30 day(s)      tavaborole 5 % solution with applicator 1 Application early in the morning..  1 application Externally Once a day      tretinoin (Retin-A) 0.025 % cream Retin-A 0.025 % External Cream   Refills: 0       Active      triamcinolone acetonide (KENALOG) 40 mg/mL kit as directed Combination      ZINC GLUCONATE ORAL Take 1 tablet by mouth once daily.  TAKE 1 TABLET DAILY.       No current facility-administered medications on file prior to visit.     Immunization History   Administered Date(s) Administered    DTaP vaccine, pediatric  (INFANRIX) 03/28/2012    Flu vaccine (IIV4), preservative free *Check age/dose* 10/12/2020, 10/06/2021, 09/20/2022    Flu vaccine, quadrivalent, high-dose, preservative free, age 65y+ (FLUZONE) 10/16/2023    Hepatitis A vaccine, age 19 years and greater (HAVRIX) 09/03/2021    Hepatitis B vaccine, adult (RECOMBIVAX, ENGERIX) 09/03/2021, 11/10/2021    Moderna COVID-19 vaccine, bivalent, blue cap/gray  label *Check age/dose* 11/18/2022    Moderna SARS-CoV-2 Vaccination 04/07/2021, 05/07/2021, 01/17/2022, 07/16/2022    Pneumococcal conjugate vaccine, 13-valent (PREVNAR 13) 11/19/2019    Pneumococcal polysaccharide vaccine, 23-valent, age 2 years and older (PNEUMOVAX 23) 10/02/2018    Td vaccine, age 7 years and older (TDVAX) 01/01/2000    Zoster vaccine, recombinant, adult (SHINGRIX) 11/19/2019     Patient's medical history was reviewed and updated either before or during this encounter.    Kermit Soriano MD

## 2023-12-18 ENCOUNTER — PHARMACY VISIT (OUTPATIENT)
Dept: PHARMACY | Facility: CLINIC | Age: 63
End: 2023-12-18

## 2023-12-18 PROCEDURE — RXMED WILLOW AMBULATORY MEDICATION CHARGE

## 2023-12-22 ENCOUNTER — APPOINTMENT (OUTPATIENT)
Dept: SURGERY | Facility: CLINIC | Age: 63
End: 2023-12-22
Payer: COMMERCIAL

## 2024-01-10 ENCOUNTER — OFFICE VISIT (OUTPATIENT)
Dept: PRIMARY CARE | Facility: CLINIC | Age: 64
End: 2024-01-10
Payer: MEDICARE

## 2024-01-10 VITALS
WEIGHT: 211 LBS | OXYGEN SATURATION: 97 % | HEIGHT: 70 IN | HEART RATE: 102 BPM | BODY MASS INDEX: 30.21 KG/M2 | DIASTOLIC BLOOD PRESSURE: 61 MMHG | SYSTOLIC BLOOD PRESSURE: 107 MMHG | TEMPERATURE: 97.8 F

## 2024-01-10 DIAGNOSIS — R05.3 CHRONIC COUGH: ICD-10-CM

## 2024-01-10 DIAGNOSIS — Z23 ENCOUNTER FOR IMMUNIZATION: ICD-10-CM

## 2024-01-10 DIAGNOSIS — E78.00 PURE HYPERCHOLESTEROLEMIA: Primary | ICD-10-CM

## 2024-01-10 DIAGNOSIS — B00.9 RECURRENT HERPES SIMPLEX: ICD-10-CM

## 2024-01-10 DIAGNOSIS — K21.9 GASTROESOPHAGEAL REFLUX DISEASE WITHOUT ESOPHAGITIS: ICD-10-CM

## 2024-01-10 DIAGNOSIS — N52.9 VASCULOGENIC ERECTILE DYSFUNCTION, UNSPECIFIED VASCULOGENIC ERECTILE DYSFUNCTION TYPE: ICD-10-CM

## 2024-01-10 DIAGNOSIS — J44.9 CHRONIC OBSTRUCTIVE PULMONARY DISEASE, UNSPECIFIED COPD TYPE (MULTI): ICD-10-CM

## 2024-01-10 PROCEDURE — 90677 PCV20 VACCINE IM: CPT | Performed by: INTERNAL MEDICINE

## 2024-01-10 PROCEDURE — 1126F AMNT PAIN NOTED NONE PRSNT: CPT | Performed by: INTERNAL MEDICINE

## 2024-01-10 PROCEDURE — 90677 PCV20 VACCINE IM: CPT

## 2024-01-10 PROCEDURE — 99214 OFFICE O/P EST MOD 30 MIN: CPT | Performed by: INTERNAL MEDICINE

## 2024-01-10 PROCEDURE — 1159F MED LIST DOCD IN RCRD: CPT | Performed by: INTERNAL MEDICINE

## 2024-01-10 RX ORDER — ATORVASTATIN CALCIUM 20 MG/1
20 TABLET, FILM COATED ORAL DAILY
Qty: 90 TABLET | Refills: 1 | Status: SHIPPED | OUTPATIENT
Start: 2024-01-10

## 2024-01-10 RX ORDER — SILDENAFIL 100 MG/1
100 TABLET, FILM COATED ORAL DAILY PRN
Qty: 10 TABLET | Refills: 5 | Status: SHIPPED | OUTPATIENT
Start: 2024-01-10 | End: 2024-01-10

## 2024-01-10 RX ORDER — BENZONATATE 200 MG/1
200 CAPSULE ORAL 2 TIMES DAILY
Qty: 60 CAPSULE | Refills: 5 | Status: SHIPPED | OUTPATIENT
Start: 2024-01-10 | End: 2024-01-10

## 2024-01-10 RX ORDER — FLUTICASONE FUROATE AND VILANTEROL 100; 25 UG/1; UG/1
1 POWDER RESPIRATORY (INHALATION) DAILY
Qty: 3 EACH | Refills: 1 | Status: SHIPPED | OUTPATIENT
Start: 2024-01-10 | End: 2024-05-14

## 2024-01-10 RX ORDER — FAMCICLOVIR 125 MG/1
250 TABLET ORAL 2 TIMES DAILY
Qty: 60 TABLET | Refills: 1 | Status: SHIPPED | OUTPATIENT
Start: 2024-01-10 | End: 2024-01-12

## 2024-01-10 RX ORDER — BENZONATATE 200 MG/1
200 CAPSULE ORAL 2 TIMES DAILY
Qty: 60 CAPSULE | Refills: 5 | Status: SHIPPED | OUTPATIENT
Start: 2024-01-10 | End: 2024-01-31 | Stop reason: SDUPTHER

## 2024-01-10 RX ORDER — SILDENAFIL 100 MG/1
100 TABLET, FILM COATED ORAL DAILY PRN
Qty: 10 TABLET | Refills: 5 | Status: SHIPPED | OUTPATIENT
Start: 2024-01-10 | End: 2025-01-09

## 2024-01-10 RX ORDER — PROMETHAZINE HYDROCHLORIDE 25 MG/ML
25 INJECTION, SOLUTION INTRAMUSCULAR; INTRAVENOUS EVERY 6 HOURS
Qty: 120 ML | Refills: 1 | Status: SHIPPED | OUTPATIENT
Start: 2024-01-10 | End: 2024-03-21

## 2024-01-10 ASSESSMENT — PATIENT HEALTH QUESTIONNAIRE - PHQ9
SUM OF ALL RESPONSES TO PHQ9 QUESTIONS 1 AND 2: 0
2. FEELING DOWN, DEPRESSED OR HOPELESS: NOT AT ALL
1. LITTLE INTEREST OR PLEASURE IN DOING THINGS: NOT AT ALL

## 2024-01-10 ASSESSMENT — ENCOUNTER SYMPTOMS
LOSS OF SENSATION IN FEET: 0
DEPRESSION: 0
OCCASIONAL FEELINGS OF UNSTEADINESS: 0

## 2024-01-10 ASSESSMENT — PAIN SCALES - GENERAL: PAINLEVEL: 0-NO PAIN

## 2024-01-10 NOTE — PROGRESS NOTES
[facility]: [facility]  PROGRESS NOTE      Gregor Finney is a 64 y.o. male that is presenting today for Follow-up (Med refill).    Assessment/Plan   Diagnoses and all orders for this visit:  COPD(CMS/HCC)        Under control with current treatment   Continue the same   Rx E-scripted 90 days x 1  -    Continue fluticasone furoate-vilanteroL (Breo Ellipta) 100-25 mcg/dose inhaler; Inhale 1 puff once daily.  1 puff Inhalation Once a day for 90 days  Chronic cough     Under control with current treatment   Continue the same   Rx E-scripted 30 days x 3  -   Continue  benzonatate (Tessalon) 200 mg capsule; Take 1 capsule (200 mg) by mouth 2 times a day. Do not crush or chew.  -   Continue  fluticasone furoate-vilanteroL (Breo Ellipta) 100-25 mcg/dose inhaler; Inhale 1 puff once daily.  1 puff Inhalation Once a day for 90 days  Pure hypercholesterolemia     Under control with current treatment   Continue the same   Rx E-scripted 90 days x 1  -   Continue   atorvastatin (Lipitor) 20 mg tablet; Take 1 tablet (20 mg) by mouth once daily.  1 tablet Orally Once a day for 90 day(s)  Gastroesophageal reflux disease without esophagitis      Under control with current treatment   Continue the same   Rx E-scripted 30 days x 3  -   Continue  promethazine (Phenergan) 25 mg/mL injection; Inject 1 mL (25 mg) into the muscle every 6 hours.  1 mL as needed Injection every 6 hrs  Vasculogenic erectile dysfunction, unspecified vasculogenic erectile dysfunction type      Advised the patient to hold it till done with the NTG cream in 2 weeks  -     sildenafil (Viagra) 100 mg tablet; Take 1 tablet (100 mg) by mouth once daily as needed for erectile dysfunction.  Recurrent herpes simplex     Under control with current treatment   Continue the same   Rx E-scripted 90 days x 1  -   Continue  famciclovir (Famvir) 125 mg tablet; Take 2 tablets (250 mg) by mouth 2 times a day.  Encounter for immunization  -      Pneumococcal conjugate vaccine, 20-valent (PREVNAR 20)  Subjective   - Patient is here today for FUV and needs refills         - Patient denies any other symptoms or concerns at this time.    - patient denies any adverse reactions to or concerns with his/her meds.      Review of Systems   All pertinent POSITIVES as noted per HPI.  All other systems have been reviewed and are NEGATIVE and /or Noncontributory to this patient current visit or complaint.  Objective   Vitals:    01/10/24 1033   BP: 107/61   Pulse: 102   Temp: 36.6 °C (97.8 °F)   SpO2: 97%      Body mass index is 30.28 kg/m².  Physical Exam  Vitals and nursing note reviewed.   Constitutional:       Appearance: Normal appearance.   HENT:      Head: Normocephalic and atraumatic.   Neck:      Vascular: No carotid bruit.   Cardiovascular:      Rate and Rhythm: Normal rate and regular rhythm.      Pulses: Normal pulses.      Heart sounds: Normal heart sounds.   Pulmonary:      Effort: Pulmonary effort is normal.      Breath sounds: Normal breath sounds.   Abdominal:      General: Abdomen is flat. Bowel sounds are normal.      Palpations: Abdomen is soft.   Musculoskeletal:         General: No swelling. Normal range of motion.      Cervical back: Neck supple.   Lymphadenopathy:      Cervical: No cervical adenopathy.   Skin:     General: Skin is warm and dry.   Neurological:      Mental Status: He is alert.   Psychiatric:         Mood and Affect: Mood normal.     Diagnostic Results   Lab Results   Component Value Date    GLUCOSE 108 (H) 07/11/2023    CALCIUM 9.6 07/11/2023     07/11/2023    K 4.6 07/11/2023    CO2 23 (L) 07/11/2023     07/11/2023    BUN 21 07/11/2023    CREATININE 1.2 07/11/2023     Lab Results   Component Value Date    ALT 24 10/16/2023    AST 17 10/16/2023    ALKPHOS 109 10/16/2023    BILITOT 0.4 10/16/2023     Lab Results   Component Value Date    WBC 18.2 (H) 03/09/2023    HGB 15.6 03/09/2023    HCT 46.5 03/09/2023    MCV 95  "03/09/2023     03/09/2023     Lab Results   Component Value Date    CHOL 150 07/11/2023     Lab Results   Component Value Date    HDL 33 (L) 07/11/2023     Lab Results   Component Value Date    LDLCALC 85 07/11/2023     Lab Results   Component Value Date    TRIG 160 (H) 07/11/2023     No components found for: \"CHOLHDL\"  Lab Results   Component Value Date    HGBA1C 5.8 07/11/2023     Other labs not included in the list above were reviewed either before or during this encounter.    History    Past Medical History:   Diagnosis Date    Personal history of other endocrine, nutritional and metabolic disease     History of hypercholesterolemia     Past Surgical History:   Procedure Laterality Date    OTHER SURGICAL HISTORY  09/16/2019    Nasal septoplasty    OTHER SURGICAL HISTORY  09/16/2019    Knee surgery    OTHER SURGICAL HISTORY  09/16/2019    Elbow surgery     Family History   Problem Relation Name Age of Onset    COPD Mother      Emphysema Mother      Stroke Other      Diabetes Other      Cancer Other      Heart attack Other       Social History     Socioeconomic History    Marital status:      Spouse name: Not on file    Number of children: Not on file    Years of education: Not on file    Highest education level: Not on file   Occupational History    Not on file   Tobacco Use    Smoking status: Every Day     Packs/day: 1     Types: Cigarettes     Passive exposure: Current    Smokeless tobacco: Never   Vaping Use    Vaping Use: Never used   Substance and Sexual Activity    Alcohol use: Yes     Comment: sometimes    Drug use: Never    Sexual activity: Never   Other Topics Concern    Not on file   Social History Narrative    Not on file     Social Determinants of Health     Financial Resource Strain: Not on file   Food Insecurity: Not on file   Transportation Needs: Not on file   Physical Activity: Not on file   Stress: Not on file   Social Connections: Not on file   Intimate Partner Violence: Not on " file   Housing Stability: Not on file     Allergies   Allergen Reactions    Levofloxacin Other     foot, knee pain    Sulfa (Sulfonamide Antibiotics) Rash    Tetracycline Rash     Current Outpatient Medications on File Prior to Visit   Medication Sig Dispense Refill    acyclovir (Zovirax) 5 % ointment Apply 1 Application topically 6 times a day. 1 application every 3 hours Externally Six times a day      amoxicillin (Amoxil) 250 mg capsule Take 1 capsule (250 mg) by mouth once daily as needed.      ascorbic acid (Vitamin C) 250 mg tablet Take 1 tablet (250 mg) by mouth once daily. TAKE 1 TABLET DAILY.      cholecalciferol (Vitamin D3) 25 MCG (1000 UT) tablet Take 1 capsule by mouth once daily.  1 capsule Orally Once a day for 30 day(s)      EC-Naproxen 500 mg EC tablet       fluticasone (Flonase) 50 mcg/actuation nasal spray Administer 1 spray into each nostril once daily as needed. 1 spray in each nostril Nasally Once a day as needed for 90 days      ibuprofen 800 mg tablet Take 1 tablet (800 mg) by mouth 3 times a day as needed.  1 tablet Orally Three times a day as needed( not every day) for 30 days      naproxen sodium (Aleve) 220 mg tablet Take 1 tablet (220 mg) by mouth once daily as needed.  ONE TABLET DAILY PRN      nitroglycerin in white petrolatum topical jelly Apply a pea-sized amount to anterior anus 3 times a day 60 g 1    promethazine (Phenergan) 6.25 mg/5 mL syrup TAKE 1 MILLILITER BY MOUTH EVERY 6 HOURS AS NEEDED 360 mL 1    tretinoin (Retin-A) 0.025 % cream Retin-A 0.025 % External Cream   Refills: 0       Active      ZINC GLUCONATE ORAL Take 1 tablet by mouth once daily.  TAKE 1 TABLET DAILY.      [DISCONTINUED] albuterol 90 mcg/actuation inhaler Inhale 1-2 puffs.  INHALE 1 TO 2 PUFFS EVERY 4 TO 6 HOURS AS NEEDED.      [DISCONTINUED] atorvastatin (Lipitor) 20 mg tablet Take 1 tablet (20 mg) by mouth once daily.  1 tablet Orally Once a day for 90 day(s)      [DISCONTINUED] benzonatate (Tessalon) 200  mg capsule Take 1 capsule (200 mg) by mouth 3 times a day as needed.  TAKE 1 CAPSULE 3 TIMES DAILY AS NEEDED.      [DISCONTINUED] famciclovir (Famvir) 125 mg tablet every 12 hours.      [DISCONTINUED] fluticasone furoate-vilanteroL (Breo Ellipta) 100-25 mcg/dose inhaler Inhale 1 puff once daily.  1 puff Inhalation Once a day for 90 days      [DISCONTINUED] promethazine (Phenergan) 25 mg/mL injection Inject 1 mL (25 mg) into the muscle every 6 hours.  1 mL as needed Injection every 6 hrs      [DISCONTINUED] sildenafil (Viagra) 100 mg tablet Take 1 tablet (100 mg) by mouth once daily as needed.  1 tablet as needed Orally Once a day as needed for 90 day(s)      [DISCONTINUED] alpha lipoic acid (LIPOIC ACID ORAL) Take 1 capsule by mouth early in the morning..  ONE DOSEAGE DAILY      [DISCONTINUED] calcium carb-vitamin D3-vit K2 600 mg-1,000 unit-90 mcg tablet 1 capsule once every 24 hours.      [DISCONTINUED] hydrocortisone (Anusol-HC) 25 mg suppository Insert 1 suppository (25 mg) into the rectum 2 times a day as needed for hemorrhoids. 30 suppository 0    [DISCONTINUED] hydrocortisone-pramoxine (Proctofoam HC) rectal foam Insert 1 applicator into the rectum 3 times a day. 45 g 0    [DISCONTINUED] mineral oil-hydrophilic petrolatum 300 Application Nitroglycerin 0.2% ointment.  Apply pea sized amount to the anterior anus 3 times daily 60 g 1    [DISCONTINUED] promethazine-DM (Phenergan-DM) 6.25-15 mg/5 mL syrup TAKE 5-10 ML AS NEEDED ORALLY EVERY 6 HOURS      [DISCONTINUED] promethazine-phenylephrine (Phenergan-VC) 6.25-5 mg/5 mL syrup Take by mouth.      [DISCONTINUED] tadalafil (Cialis) 20 mg tablet Take 1 tablet (20 mg) by mouth.  1 tablet Orally every 36 hrs prn for 30 day(s)      [DISCONTINUED] tavaborole 5 % solution with applicator 1 Application early in the morning..  1 application Externally Once a day      [DISCONTINUED] triamcinolone acetonide (KENALOG) 40 mg/mL kit as directed Combination       No current  facility-administered medications on file prior to visit.     Immunization History   Administered Date(s) Administered    DTaP vaccine, pediatric  (INFANRIX) 03/28/2012    Flu vaccine (IIV4), preservative free *Check age/dose* 10/12/2020, 10/06/2021, 09/20/2022    Flu vaccine, quadrivalent, high-dose, preservative free, age 65y+ (FLUZONE) 10/16/2023    Hepatitis A vaccine, age 19 years and greater (HAVRIX) 09/03/2021    Hepatitis B vaccine, adult (RECOMBIVAX, ENGERIX) 09/03/2021, 11/10/2021    Moderna COVID-19 vaccine, bivalent, blue cap/gray label *Check age/dose* 11/18/2022    Moderna SARS-CoV-2 Vaccination 04/07/2021, 05/07/2021, 01/17/2022, 07/16/2022    Pneumococcal conjugate vaccine, 13-valent (PREVNAR 13) 11/19/2019    Pneumococcal polysaccharide vaccine, 23-valent, age 2 years and older (PNEUMOVAX 23) 10/02/2018    Td vaccine, age 7 years and older (TDVAX) 01/01/2000    Zoster vaccine, recombinant, adult (SHINGRIX) 11/19/2019     Patient's medical history was reviewed and updated either before or during this encounter.       Pardeep Max MD

## 2024-01-11 NOTE — TELEPHONE ENCOUNTER
PATIENT STATES HIS TESSELON SHOULD BE  90 DAYS BID ASWELL 90 DAY SILDILIL #30 TO [facility] SEE PENDED MEDS

## 2024-01-12 RX ORDER — FAMCICLOVIR 125 MG/1
250 TABLET ORAL 2 TIMES DAILY
Qty: 60 TABLET | Refills: 1 | Status: SHIPPED | OUTPATIENT
Start: 2024-01-12

## 2024-01-12 RX ORDER — BENZONATATE 200 MG/1
200 CAPSULE ORAL 2 TIMES DAILY
Qty: 180 CAPSULE | Refills: 0 | OUTPATIENT
Start: 2024-01-12 | End: 2024-04-11

## 2024-01-12 RX ORDER — SILDENAFIL 100 MG/1
100 TABLET, FILM COATED ORAL DAILY PRN
Qty: 30 TABLET | Refills: 0 | OUTPATIENT
Start: 2024-01-12 | End: 2024-04-11

## 2024-01-15 DIAGNOSIS — J98.01 BRONCHOSPASM: Primary | ICD-10-CM

## 2024-01-15 NOTE — TELEPHONE ENCOUNTER
Pt states he used to get albuterol sulfate solution 2.5 mg/3 mL from previous MD.  [address] [place] not scheduled.

## 2024-01-16 RX ORDER — ALBUTEROL SULFATE 0.83 MG/ML
2.5 SOLUTION RESPIRATORY (INHALATION) EVERY 6 HOURS PRN
Qty: 100 ML | Refills: 1 | Status: SHIPPED | OUTPATIENT
Start: 2024-01-16 | End: 2024-05-09 | Stop reason: SDUPTHER

## 2024-01-16 RX ORDER — ALBUTEROL SULFATE 90 UG/1
1-2 AEROSOL, METERED RESPIRATORY (INHALATION) EVERY 6 HOURS PRN
Qty: 18 G | Refills: 1 | Status: SHIPPED | OUTPATIENT
Start: 2024-01-16 | End: 2024-04-04 | Stop reason: WASHOUT

## 2024-01-24 DIAGNOSIS — B00.9 RECURRENT HERPES SIMPLEX: ICD-10-CM

## 2024-01-28 RX ORDER — FAMCICLOVIR 125 MG/1
250 TABLET ORAL 2 TIMES DAILY
Qty: 360 TABLET | Refills: 0 | OUTPATIENT
Start: 2024-01-28 | End: 2024-04-27

## 2024-01-31 DIAGNOSIS — R05.3 CHRONIC COUGH: ICD-10-CM

## 2024-01-31 NOTE — TELEPHONE ENCOUNTER
Patient called questioning if he to get  repeat lab work for Liver functions done. Was mentioned at his appt but there is no order. Also needs new RX for 90 days for tesslon

## 2024-02-02 RX ORDER — BENZONATATE 200 MG/1
200 CAPSULE ORAL 2 TIMES DAILY
Qty: 180 CAPSULE | Refills: 3 | Status: SHIPPED | OUTPATIENT
Start: 2024-02-02 | End: 2024-04-04 | Stop reason: SDUPTHER

## 2024-02-07 ENCOUNTER — HOSPITAL ENCOUNTER (OUTPATIENT)
Dept: RADIOLOGY | Facility: HOSPITAL | Age: 64
Discharge: HOME | End: 2024-02-07
Payer: MEDICARE

## 2024-02-07 DIAGNOSIS — Z12.2 ENCOUNTER FOR SCREENING FOR LUNG CANCER: ICD-10-CM

## 2024-02-07 DIAGNOSIS — Z72.0 TOBACCO USE: ICD-10-CM

## 2024-02-07 DIAGNOSIS — Z87.891 PERSONAL HISTORY OF NICOTINE DEPENDENCE: ICD-10-CM

## 2024-02-07 PROCEDURE — 71271 CT THORAX LUNG CANCER SCR C-: CPT

## 2024-02-09 ENCOUNTER — OFFICE VISIT (OUTPATIENT)
Dept: SURGERY | Facility: CLINIC | Age: 64
End: 2024-02-09
Payer: MEDICARE

## 2024-02-09 VITALS
TEMPERATURE: 98.1 F | BODY MASS INDEX: 30.38 KG/M2 | HEIGHT: 70 IN | HEART RATE: 92 BPM | DIASTOLIC BLOOD PRESSURE: 80 MMHG | WEIGHT: 212.2 LBS | SYSTOLIC BLOOD PRESSURE: 148 MMHG

## 2024-02-09 DIAGNOSIS — K64.5 THROMBOSED EXTERNAL HEMORRHOID: Primary | ICD-10-CM

## 2024-02-09 DIAGNOSIS — Z12.11 SCREENING FOR COLON CANCER: Primary | ICD-10-CM

## 2024-02-09 PROCEDURE — 1159F MED LIST DOCD IN RCRD: CPT | Performed by: SURGERY

## 2024-02-09 PROCEDURE — 99213 OFFICE O/P EST LOW 20 MIN: CPT | Performed by: SURGERY

## 2024-02-09 PROCEDURE — 1126F AMNT PAIN NOTED NONE PRSNT: CPT | Performed by: SURGERY

## 2024-02-09 ASSESSMENT — COLUMBIA-SUICIDE SEVERITY RATING SCALE - C-SSRS
1. IN THE PAST MONTH, HAVE YOU WISHED YOU WERE DEAD OR WISHED YOU COULD GO TO SLEEP AND NOT WAKE UP?: NO
6. HAVE YOU EVER DONE ANYTHING, STARTED TO DO ANYTHING, OR PREPARED TO DO ANYTHING TO END YOUR LIFE?: NO
2. HAVE YOU ACTUALLY HAD ANY THOUGHTS OF KILLING YOURSELF?: NO

## 2024-02-09 ASSESSMENT — ENCOUNTER SYMPTOMS
GASTROINTESTINAL NEGATIVE: 1
EYES NEGATIVE: 1
CONSTITUTIONAL NEGATIVE: 1
CARDIOVASCULAR NEGATIVE: 1
DEPRESSION: 0
OCCASIONAL FEELINGS OF UNSTEADINESS: 0
RESPIRATORY NEGATIVE: 1
LOSS OF SENSATION IN FEET: 0

## 2024-02-09 ASSESSMENT — PATIENT HEALTH QUESTIONNAIRE - PHQ9
1. LITTLE INTEREST OR PLEASURE IN DOING THINGS: NOT AT ALL
2. FEELING DOWN, DEPRESSED OR HOPELESS: NOT AT ALL
SUM OF ALL RESPONSES TO PHQ9 QUESTIONS 1 & 2: 0

## 2024-02-09 ASSESSMENT — PAIN SCALES - GENERAL: PAINLEVEL: 0-NO PAIN

## 2024-02-09 NOTE — PROGRESS NOTES
[facility]: GENERAL SURGERY  PROGRESS NOTE      Gregor Finney is a 64 y.o. male that is presenting today for Follow-up (Thrombosed  hemorrhoid smaller in size with using nirto bid but not completely gone. ).    ASSESSMENT / PLAN:  Diagnoses and all orders for this visit:  Thrombosed external hemorrhoid  Large hemorrhoidal skin tag from prior thrombosed external hemorrhoid.  Discussed excision in the operating room.  Patient will consider and call if he decides to pursue.  No current internal hemorrhoidal issues, no plans for intervention currently.  No documented screening in last 10 years, plan for colonoscopy in OR at time of hemorrhoid surgery  Patient Active Problem List   Diagnosis    Chronic rhinitis    Cervical radiculitis    Displacement of lumbar intervertebral disc    Basophilic leukocytosis    Eosinophilic leukocytosis    Epistaxis    Erectile dysfunction    Gastroesophageal reflux disease    Hepatic steatosis    Hypothyroidism    Neutrophilic leukocytosis    Chronic obstructive pulmonary disease (CMS/HCC)    Chronic emphysema syndrome (CMS/HCC)    Pure hypercholesterolemia    Reactive airway disease without complication    Smoker    Radiculopathy, lumbar region    Spinal stenosis of lumbar region with neurogenic claudication    Transaminasemia    Vitamin D deficiency    Thrombosed external hemorrhoid     Subjective   Seen in December 3 cm thrombosed hemorrhoid in the left posterior quadrant.  Topical nitroglycerin prescribed to expedite resolution.  Significant side effects with nitroglycerin and syncopal event with Cialis.  No pain but occasional discomfort with wiping and issues with fecal smearing.  No bleeding or issues with bowel movements.    Review of Systems   Constitutional: Negative.    HENT: Negative.     Eyes: Negative.    Respiratory: Negative.     Cardiovascular: Negative.    Gastrointestinal: Negative.    Genitourinary: Negative.    Skin: Negative.    All other systems  "reviewed and are negative.     Objective   Vitals:    02/09/24 1203   BP: 148/80   Pulse: 92   Temp: 36.7 °C (98.1 °F)      Physical Exam  Constitutional:       Appearance: Normal appearance.   HENT:      Head: Normocephalic.   Eyes:      Pupils: Pupils are equal, round, and reactive to light.   Cardiovascular:      Rate and Rhythm: Normal rate.   Pulmonary:      Effort: Pulmonary effort is normal.   Abdominal:      General: Abdomen is flat. Bowel sounds are normal.      Palpations: Abdomen is soft.   Genitourinary:     Comments: The patient was placed in left lateral decubitus position. Perianal skin was examined with a large hemorrhoidal skin tag in the left lateral quadrant from prior thrombosis.  No evidence of thrombosis currently.  Grade 2 internal hemorrhoids seen on exam  Skin:     General: Skin is warm.   Neurological:      General: No focal deficit present.      Mental Status: He is alert.         Diagnostic Results   Lab Results   Component Value Date    GLUCOSE 108 (H) 07/11/2023    CALCIUM 9.6 07/11/2023     07/11/2023    K 4.6 07/11/2023    CO2 23 (L) 07/11/2023     07/11/2023    BUN 21 07/11/2023    CREATININE 1.2 07/11/2023     Lab Results   Component Value Date    ALT 24 10/16/2023    AST 17 10/16/2023    ALKPHOS 109 10/16/2023    BILITOT 0.4 10/16/2023     Lab Results   Component Value Date    WBC 18.2 (H) 03/09/2023    HGB 15.6 03/09/2023    HCT 46.5 03/09/2023    MCV 95 03/09/2023     03/09/2023     Lab Results   Component Value Date    CHOL 150 07/11/2023     Lab Results   Component Value Date    HDL 33 (L) 07/11/2023     Lab Results   Component Value Date    LDLCALC 85 07/11/2023     Lab Results   Component Value Date    TRIG 160 (H) 07/11/2023     No components found for: \"CHOLHDL\"  Lab Results   Component Value Date    HGBA1C 5.8 07/11/2023     Other labs not included in the list above were reviewed either before or during this encounter.    History    Past Medical History: "   Diagnosis Date    Personal history of other endocrine, nutritional and metabolic disease     History of hypercholesterolemia     Past Surgical History:   Procedure Laterality Date    OTHER SURGICAL HISTORY  09/16/2019    Nasal septoplasty    OTHER SURGICAL HISTORY  09/16/2019    Knee surgery    OTHER SURGICAL HISTORY  09/16/2019    Elbow surgery     Family History   Problem Relation Name Age of Onset    COPD Mother      Emphysema Mother      Stroke Other      Diabetes Other      Cancer Other      Heart attack Other       Allergies   Allergen Reactions    Levofloxacin Other     foot, knee pain    Sulfa (Sulfonamide Antibiotics) Rash    Tetracycline Rash     Current Outpatient Medications on File Prior to Visit   Medication Sig Dispense Refill    albuterol 2.5 mg /3 mL (0.083 %) nebulizer solution Take 3 mL (2.5 mg) by nebulization every 6 hours if needed for wheezing or shortness of breath. 100 mL 1    albuterol 90 mcg/actuation inhaler Inhale 1-2 puffs every 6 hours if needed for wheezing. 18 g 1    amoxicillin (Amoxil) 250 mg capsule Take 1 capsule (250 mg) by mouth once daily as needed.      ascorbic acid (Vitamin C) 250 mg tablet Take 1 tablet (250 mg) by mouth once daily. TAKE 1 TABLET DAILY.      atorvastatin (Lipitor) 20 mg tablet Take 1 tablet (20 mg) by mouth once daily.  1 tablet Orally Once a day for 90 day(s) 90 tablet 1    benzonatate (Tessalon) 200 mg capsule Take 1 capsule (200 mg) by mouth 2 times a day. Do not crush or chew. 180 capsule 3    cholecalciferol (Vitamin D3) 25 MCG (1000 UT) tablet Take 1 capsule by mouth once daily.  1 capsule Orally Once a day for 30 day(s)      famciclovir (Famvir) 125 mg tablet TAKE 2 TABLETS (250 MG) BY MOUTH 2 TIMES A DAY. 60 tablet 1    fluticasone furoate-vilanteroL (Breo Ellipta) 100-25 mcg/dose inhaler Inhale 1 puff once daily.  1 puff Inhalation Once a day for 90 days 3 each 1    ibuprofen 800 mg tablet Take 1 tablet (800 mg) by mouth 3 times a day as needed.   1 tablet Orally Three times a day as needed( not every day) for 30 days      naproxen sodium (Aleve) 220 mg tablet Take 1 tablet (220 mg) by mouth once daily as needed.  ONE TABLET DAILY PRN      nitroglycerin in white petrolatum topical jelly Apply a pea-sized amount to anterior anus 3 times a day 60 g 1    promethazine (Phenergan) 25 mg/mL injection Inject 1 mL (25 mg) into the muscle every 6 hours.  1 mL as needed Injection every 6 hrs 120 mL 1    promethazine (Phenergan) 6.25 mg/5 mL syrup TAKE 1 MILLILITER BY MOUTH EVERY 6 HOURS AS NEEDED 360 mL 1    sildenafil (Viagra) 100 mg tablet Take 1 tablet (100 mg) by mouth once daily as needed for erectile dysfunction. 10 tablet 5    ZINC GLUCONATE ORAL Take 1 tablet by mouth once daily.  TAKE 1 TABLET DAILY.      [DISCONTINUED] acyclovir (Zovirax) 5 % ointment Apply 1 Application topically 6 times a day. 1 application every 3 hours Externally Six times a day      [DISCONTINUED] EC-Naproxen 500 mg EC tablet       [DISCONTINUED] fluticasone (Flonase) 50 mcg/actuation nasal spray Administer 1 spray into each nostril once daily as needed. 1 spray in each nostril Nasally Once a day as needed for 90 days      [DISCONTINUED] tretinoin (Retin-A) 0.025 % cream Retin-A 0.025 % External Cream   Refills: 0       Active       No current facility-administered medications on file prior to visit.     Immunization History   Administered Date(s) Administered    DTaP vaccine, pediatric  (INFANRIX) 03/28/2012    Flu vaccine (IIV4), preservative free *Check age/dose* 10/12/2020, 10/06/2021, 09/20/2022    Flu vaccine, quadrivalent, high-dose, preservative free, age 65y+ (FLUZONE) 10/16/2023    Hepatitis A vaccine, age 19 years and greater (HAVRIX) 09/03/2021    Hepatitis B vaccine, adult (RECOMBIVAX, ENGERIX) 09/03/2021, 11/10/2021    Moderna COVID-19 vaccine, bivalent, blue cap/gray label *Check age/dose* 11/18/2022    Moderna SARS-CoV-2 Vaccination 04/07/2021, 05/07/2021, 01/17/2022,  07/16/2022    Pneumococcal conjugate vaccine, 13-valent (PREVNAR 13) 11/19/2019    Pneumococcal conjugate vaccine, 20-valent (PREVNAR 20) 01/10/2024    Pneumococcal polysaccharide vaccine, 23-valent, age 2 years and older (PNEUMOVAX 23) 10/02/2018    Td vaccine, age 7 years and older (TDVAX) 01/01/2000    Zoster vaccine, recombinant, adult (SHINGRIX) 11/19/2019     Patient's medical history was reviewed and updated either before or during this encounter.    Kermit Soriano MD

## 2024-02-16 ENCOUNTER — PREP FOR PROCEDURE (OUTPATIENT)
Dept: SURGERY | Facility: CLINIC | Age: 64
End: 2024-02-16
Payer: COMMERCIAL

## 2024-02-16 DIAGNOSIS — Z12.11 COLON CANCER SCREENING: ICD-10-CM

## 2024-02-16 DIAGNOSIS — K64.4 EXTERNAL HEMORRHOID: Primary | ICD-10-CM

## 2024-02-16 DIAGNOSIS — Z12.11 SCREENING FOR MALIGNANT NEOPLASM OF COLON: Primary | ICD-10-CM

## 2024-02-16 RX ORDER — ACETAMINOPHEN 325 MG/1
650 TABLET ORAL ONCE
Status: CANCELLED | OUTPATIENT
Start: 2024-02-16 | End: 2024-02-16

## 2024-03-04 DIAGNOSIS — Z87.891 PERSONAL HISTORY OF NICOTINE DEPENDENCE: ICD-10-CM

## 2024-03-04 DIAGNOSIS — Z12.2 SCREENING FOR LUNG CANCER: Primary | ICD-10-CM

## 2024-03-21 ENCOUNTER — PRE-ADMISSION TESTING (OUTPATIENT)
Dept: PREADMISSION TESTING | Facility: HOSPITAL | Age: 64
End: 2024-03-21
Payer: MEDICARE

## 2024-03-21 VITALS
HEART RATE: 98 BPM | RESPIRATION RATE: 16 BRPM | WEIGHT: 212 LBS | BODY MASS INDEX: 30.35 KG/M2 | SYSTOLIC BLOOD PRESSURE: 135 MMHG | OXYGEN SATURATION: 99 % | DIASTOLIC BLOOD PRESSURE: 86 MMHG | HEIGHT: 70 IN | TEMPERATURE: 97.9 F

## 2024-03-21 DIAGNOSIS — Z01.818 PREOP TESTING: Primary | ICD-10-CM

## 2024-03-21 LAB
BASOPHILS # BLD AUTO: 0.16 X10*3/UL (ref 0–0.1)
BASOPHILS NFR BLD AUTO: 0.9 %
EOSINOPHIL # BLD AUTO: 0.98 X10*3/UL (ref 0–0.7)
EOSINOPHIL NFR BLD AUTO: 5.5 %
ERYTHROCYTE [DISTWIDTH] IN BLOOD BY AUTOMATED COUNT: 14.8 % (ref 11.5–14.5)
HCT VFR BLD AUTO: 46.6 % (ref 41–52)
HGB BLD-MCNC: 15.2 G/DL (ref 13.5–17.5)
IMM GRANULOCYTES # BLD AUTO: 0.08 X10*3/UL (ref 0–0.7)
IMM GRANULOCYTES NFR BLD AUTO: 0.4 % (ref 0–0.9)
LYMPHOCYTES # BLD AUTO: 4.19 X10*3/UL (ref 1.2–4.8)
LYMPHOCYTES NFR BLD AUTO: 23.4 %
MCH RBC QN AUTO: 31.7 PG (ref 26–34)
MCHC RBC AUTO-ENTMCNC: 32.6 G/DL (ref 32–36)
MCV RBC AUTO: 97 FL (ref 80–100)
MONOCYTES # BLD AUTO: 1.93 X10*3/UL (ref 0.1–1)
MONOCYTES NFR BLD AUTO: 10.8 %
NEUTROPHILS # BLD AUTO: 10.57 X10*3/UL (ref 1.2–7.7)
NEUTROPHILS NFR BLD AUTO: 59 %
NRBC BLD-RTO: 0 /100 WBCS (ref 0–0)
PLATELET # BLD AUTO: 446 X10*3/UL (ref 150–450)
RBC # BLD AUTO: 4.8 X10*6/UL (ref 4.5–5.9)
WBC # BLD AUTO: 17.9 X10*3/UL (ref 4.4–11.3)

## 2024-03-21 PROCEDURE — 93005 ELECTROCARDIOGRAM TRACING: CPT

## 2024-03-21 PROCEDURE — 85025 COMPLETE CBC W/AUTO DIFF WBC: CPT | Performed by: NURSE PRACTITIONER

## 2024-03-21 PROCEDURE — 99213 OFFICE O/P EST LOW 20 MIN: CPT | Performed by: NURSE PRACTITIONER

## 2024-03-21 PROCEDURE — 36415 COLL VENOUS BLD VENIPUNCTURE: CPT

## 2024-03-21 PROCEDURE — 93010 ELECTROCARDIOGRAM REPORT: CPT | Performed by: INTERNAL MEDICINE

## 2024-03-21 ASSESSMENT — DUKE ACTIVITY SCORE INDEX (DASI)
CAN YOU DO YARD WORK LIKE RAKING LEAVES, WEEDING OR PUSHING A MOWER: YES
CAN YOU PARTICIPATE IN MODERATE RECREATIONAL ACTIVITIES LIKE GOLF, BOWLING, DANCING, DOUBLES TENNIS OR THROWING A BASEBALL OR FOOTBALL: YES
CAN YOU DO HEAVY WORK AROUND THE HOUSE LIKE SCRUBBING FLOORS OR LIFTING AND MOVING HEAVY FURNITURE: YES
CAN YOU DO LIGHT WORK AROUND THE HOUSE LIKE DUSTING OR WASHING DISHES: YES
TOTAL_SCORE: 42.7
CAN YOU PARTICIPATE IN STRENOUS SPORTS LIKE SWIMMING, SINGLES TENNIS, FOOTBALL, BASKETBALL, OR SKIING: NO
CAN YOU HAVE SEXUAL RELATIONS: YES
CAN YOU RUN A SHORT DISTANCE: NO
CAN YOU WALK INDOORS, SUCH AS AROUND YOUR HOUSE: YES
DASI METS SCORE: 8
CAN YOU TAKE CARE OF YOURSELF (EAT, DRESS, BATHE, OR USE TOILET): YES
CAN YOU WALK A BLOCK OR TWO ON LEVEL GROUND: YES
CAN YOU DO MODERATE WORK AROUND THE HOUSE LIKE VACUUMING, SWEEPING FLOORS OR CARRYING GROCERIES: YES
CAN YOU CLIMB A FLIGHT OF STAIRS OR WALK UP A HILL: YES

## 2024-03-21 ASSESSMENT — CHADS2 SCORE
HYPERTENSION: NO
PRIOR STROKE OR TIA OR THROMBOEMBOLISM: NO
DIABETES: NO
AGE GREATER THAN OR EQUAL TO 75: NO
CHADS2 SCORE: 0
CHF: NO

## 2024-03-21 ASSESSMENT — ENCOUNTER SYMPTOMS
CARDIOVASCULAR NEGATIVE: 1
ALLERGIC/IMMUNOLOGIC NEGATIVE: 1
PSYCHIATRIC NEGATIVE: 1
HEMATOLOGIC/LYMPHATIC NEGATIVE: 1
EYES NEGATIVE: 1
CONSTITUTIONAL NEGATIVE: 1
GASTROINTESTINAL NEGATIVE: 1
NEUROLOGICAL NEGATIVE: 1
RESPIRATORY NEGATIVE: 1
ENDOCRINE NEGATIVE: 1
MUSCULOSKELETAL NEGATIVE: 1

## 2024-03-21 ASSESSMENT — PAIN - FUNCTIONAL ASSESSMENT: PAIN_FUNCTIONAL_ASSESSMENT: 0-10

## 2024-03-21 ASSESSMENT — PAIN SCALES - GENERAL: PAINLEVEL_OUTOF10: 0 - NO PAIN

## 2024-03-21 NOTE — PREPROCEDURE INSTRUCTIONS
Medication List            Accurate as of March 21, 2024 10:41 AM. Always use your most recent med list.                * albuterol 90 mcg/actuation inhaler  Inhale 1-2 puffs every 6 hours if needed for wheezing.  Notes to patient: NOT USING     * albuterol 2.5 mg /3 mL (0.083 %) nebulizer solution  Take 3 mL (2.5 mg) by nebulization every 6 hours if needed for wheezing or shortness of breath.  Notes to patient: NOT USING     Aleve 220 mg tablet  Generic drug: naproxen sodium  Medication Adjustments for Surgery: Stop 7 days before surgery     ascorbic acid 250 mg tablet  Commonly known as: Vitamin C  Medication Adjustments for Surgery: Stop 7 days before surgery     atorvastatin 20 mg tablet  Commonly known as: Lipitor  Take 1 tablet (20 mg) by mouth once daily.  1 tablet Orally Once a day for 90 day(s)  Medication Adjustments for Surgery: Continue until night before surgery     benzonatate 200 mg capsule  Commonly known as: Tessalon  Take 1 capsule (200 mg) by mouth 2 times a day. Do not crush or chew.  Medication Adjustments for Surgery: Continue until night before surgery     famciclovir 125 mg tablet  Commonly known as: Famvir  TAKE 2 TABLETS (250 MG) BY MOUTH 2 TIMES A DAY.  Notes to patient: MAY TAKE IF NEEDED     fluticasone furoate-vilanteroL 100-25 mcg/dose inhaler  Commonly known as: Breo Ellipta  Inhale 1 puff once daily.  1 puff Inhalation Once a day for 90 days  Notes to patient: USE MORNING OF PROCEDURE     ibuprofen 800 mg tablet  Medication Adjustments for Surgery: Stop 7 days before surgery     nitroglycerin in white petrolatum topical jelly  Apply a pea-sized amount to anterior anus 3 times a day  Notes to patient: PATIENT NOT USING     promethazine 6.25 mg/5 mL syrup  Commonly known as: Phenergan  TAKE 1 MILLILITER BY MOUTH EVERY 6 HOURS AS NEEDED     sildenafil 100 mg tablet  Commonly known as: Viagra  Take 1 tablet (100 mg) by mouth once daily as needed for erectile dysfunction.  Medication  Adjustments for Surgery: Stop 3 days before surgery     Vitamin D3 25 MCG (1000 UT) tablet  Generic drug: cholecalciferol  Medication Adjustments for Surgery: Stop 7 days before surgery     ZINC GLUCONATE ORAL  Medication Adjustments for Surgery: Stop 7 days before surgery           * This list has 2 medication(s) that are the same as other medications prescribed for you. Read the directions carefully, and ask your doctor or other care provider to review them with you.                  FOLLOW DR. MONTGOMERY'S PREP INSTRUCTIONS FOR COLONOSCOPY                NPO Instructions:    Do not eat any food after midnight the night before your surgery/procedure.    Additional Instructions:     Day of Surgery:  Review your medication instructions, take indicated medications  Wear  comfortable loose fitting clothing  Do not use moisturizers, creams, lotions or perfume  All jewelry and valuables should be left at home  PAT DISCHARGE INSTRUCTIONS    Please call the Same Day Surgery (SDS) Department of the hospital where your procedure will be performed after 2:00 PM the day before your surgery. If you are scheduled on a Monday, or a Tuesday following a Monday holiday, you will need to call on the last business day prior to your surgery.      [place] [id number] [phone]      Please let your surgeon know if:      You develop any open sores, shingles, burning or painful urination as these may increase your risk of an infection.   You no longer wish to have the surgery.   Any other personal circumstances change that may lead to the need to cancel or defer this surgery-such as being sick or getting admitted to any hospital within one week of your planned procedure.    Your contact details change, such as a change of address or phone number.    Starting now:     Please DO NOT drink alcohol or smoke for 24 hours before surgery. It is well known that quitting smoking can  make a huge difference to your health and recovery from surgery. The longer you abstain from smoking, the better your chances of a healthy recovery. If you need help with quitting, call 1800-QUIT-NOW to be connected to a trained counselor who will discuss the best methods to help you quit.     Before your surgery:    Please stop all supplements 7 days prior to surgery. Or as directed by your surgeon.   Please stop taking NSAID pain medicine such as Advil and Motrin 7 days before surgery.    If you develop any fever, cough, cold, rashes, cuts, scratches, scrapes, urinary symptoms or infection anywhere on your body (including teeth and gums) prior to surgery, please call your surgeon’s office as soon as possible. This may require treatment to reduce the chance of cancellation on the day of surgery.    The day before your surgery:   DIET- Please follow the diet instructions at the top of your packet.   Get a good night’s rest.  Use the special soap for bathing if you have been instructed to use one.    Scheduled surgery times may change and you will be notified if this occurs - please check your personal voicemail for any updates.     On the morning of surgery:   Wear comfortable, loose fitting clothes which open in the front. Please do not wear moisturizers, creams, lotions, makeup or perfume.    Please bring with you to surgery:   Photo ID and insurance card   Current list of medicines and allergies   Pacemaker/ Defibrillator/Heart stent cards   CPAP machine and mask    Slings/ splints/ crutches   A copy of your complete advanced directive/DHPOA.    Please do NOT bring with you to surgery:   All jewelry and valuables should be left at home.   Prosthetic devices such as contact lenses, hearing aids, dentures, eyelash extensions, hairpins and body piercings must be removed prior to going in to the surgical suite.    After outpatient surgery:   A responsible adult MUST accompany you at the time of discharge and stay  with you for 24 hours after your surgery. You may NOT drive yourself home after surgery.    Do not drive, operate machinery, make critical decisions or do activities that require co-ordination or balance until after a night’s sleep.   Do not drink alcoholic beverages for 24 hours.   Instructions for resuming your medications will be provided by your surgeon.    CALL YOUR DOCTOR AFTER SURGERY IF YOU HAVE:     Chills and/or a fever of 101° F or higher.    Redness, swelling, pus or drainage from your surgical wound or a bad smell from the wound.    Lightheadedness, fainting or confusion.    Persistent vomiting (throwing up) and are not able to eat or drink for 12 hours.    Three or more loose, watery bowel movements in 24 hours (diarrhea).   Difficulty or pain while urinating( after non-urological surgery)    Pain and swelling in your legs, especially if it is only on one side.    Difficulty breathing or are breathing faster than normal.    Any new concerning symptoms.

## 2024-03-21 NOTE — CPM/PAT H&P
"CPM/PAT Evaluation       Name: Gregor Finney (Gregor Finney)  /Age: 1960/64 y.o.     In-Person       Chief Complaint: hemorrhoid    HPI    Pt is a 64 year old male with a thrombosed external hemorrhoid. Pt reports the large hemorrhoidal skin tag does not cause pain or bleed. Pt stated \"It is annoying\". Pt denies problems having a BM. Pt also reports he is overdue for a colonoscopy. Pt denies nausea, abdominal pain, constipation, or diarrhea. Pt was examined by his surgeon and has been scheduled for hemorrhoid excision and colonoscopy. Pt denies CP, SOB, or dizziness.     Past Medical History:   Diagnosis Date    Adverse effect of anesthesia     Chronic cough     COPD (chronic obstructive pulmonary disease) (CMS/HCC)     Fatty liver     GERD (gastroesophageal reflux disease)     H/O leukocytosis     H/O thrombocytosis     Hyperlipidemia     Personal history of other endocrine, nutritional and metabolic disease     History of hypercholesterolemia       Past Surgical History:   Procedure Laterality Date    BACK SURGERY      BLEPHAROPLASTY Bilateral     bilateral upper blepharoplasty    OTHER SURGICAL HISTORY  2019    Nasal septoplasty    OTHER SURGICAL HISTORY Left 2019    Knee surgery    OTHER SURGICAL HISTORY Left 2019    Elbow surgery     Social History     Tobacco Use    Smoking status: Every Day     Packs/day: 1.00     Years: 53.00     Additional pack years: 0.00     Total pack years: 53.00     Types: Cigarettes     Start date:      Passive exposure: Current    Smokeless tobacco: Never   Substance Use Topics    Alcohol use: Not Currently     Comment: sometimes     Social History     Substance and Sexual Activity   Drug Use Never     Patient  reports never being sexually active.    Family History   Problem Relation Name Age of Onset    COPD Mother      Emphysema Mother      Stroke Other      Diabetes Other      Cancer Other      Heart attack Other         Allergies   Allergen " Reactions    Levofloxacin Other     foot, knee pain    Sulfa (Sulfonamide Antibiotics) Rash    Tetracycline Rash     Current Outpatient Medications   Medication Sig Dispense Refill    albuterol 2.5 mg /3 mL (0.083 %) nebulizer solution Take 3 mL (2.5 mg) by nebulization every 6 hours if needed for wheezing or shortness of breath. 100 mL 1    albuterol 90 mcg/actuation inhaler Inhale 1-2 puffs every 6 hours if needed for wheezing. 18 g 1    ascorbic acid (Vitamin C) 250 mg tablet Take 1 tablet (250 mg) by mouth once daily. TAKE 1 TABLET DAILY.      atorvastatin (Lipitor) 20 mg tablet Take 1 tablet (20 mg) by mouth once daily.  1 tablet Orally Once a day for 90 day(s) 90 tablet 1    benzonatate (Tessalon) 200 mg capsule Take 1 capsule (200 mg) by mouth 2 times a day. Do not crush or chew. 180 capsule 3    cholecalciferol (Vitamin D3) 25 MCG (1000 UT) tablet Take 1 capsule by mouth once daily.  1 capsule Orally Once a day for 30 day(s)      famciclovir (Famvir) 125 mg tablet TAKE 2 TABLETS (250 MG) BY MOUTH 2 TIMES A DAY. (Patient taking differently: Take 2 tablets (250 mg) by mouth once daily as needed.) 60 tablet 1    fluticasone furoate-vilanteroL (Breo Ellipta) 100-25 mcg/dose inhaler Inhale 1 puff once daily.  1 puff Inhalation Once a day for 90 days 3 each 1    ibuprofen 800 mg tablet Take 1 tablet (800 mg) by mouth 3 times a day as needed.  1 tablet Orally Three times a day as needed( not every day) for 30 days      naproxen sodium (Aleve) 220 mg tablet Take 1 tablet (220 mg) by mouth once daily as needed.  ONE TABLET DAILY PRN      nitroglycerin in white petrolatum topical jelly Apply a pea-sized amount to anterior anus 3 times a day 60 g 1    promethazine (Phenergan) 6.25 mg/5 mL syrup TAKE 1 MILLILITER BY MOUTH EVERY 6 HOURS AS NEEDED 360 mL 1    sildenafil (Viagra) 100 mg tablet Take 1 tablet (100 mg) by mouth once daily as needed for erectile dysfunction. 10 tablet 5    ZINC GLUCONATE ORAL Take 1 tablet by  "mouth once daily.  TAKE 1 TABLET DAILY.       No current facility-administered medications for this visit.     Review of Systems   Constitutional: Negative.    HENT: Negative.     Eyes: Negative.    Respiratory: Negative.     Cardiovascular: Negative.    Gastrointestinal: Negative.    Endocrine: Negative.    Genitourinary: Negative.    Musculoskeletal: Negative.    Skin: Negative.    Allergic/Immunologic: Negative.    Neurological: Negative.    Hematological: Negative.    Psychiatric/Behavioral: Negative.       /86   Pulse 98   Temp 36.6 °C (97.9 °F) (Temporal)   Resp 16   Ht 1.778 m (5' 10\")   Wt 96.2 kg (212 lb)   SpO2 99%   BMI 30.42 kg/m²     Physical Exam  Vitals reviewed.   Constitutional:       Appearance: Normal appearance.   HENT:      Head: Normocephalic and atraumatic.      Nose: Nose normal.      Mouth/Throat:      Mouth: Mucous membranes are moist.      Pharynx: Oropharynx is clear.   Eyes:      Extraocular Movements: Extraocular movements intact.      Conjunctiva/sclera: Conjunctivae normal.      Pupils: Pupils are equal, round, and reactive to light.   Cardiovascular:      Rate and Rhythm: Normal rate and regular rhythm.      Pulses: Normal pulses.      Heart sounds: Normal heart sounds.   Pulmonary:      Effort: Pulmonary effort is normal.      Breath sounds: Normal breath sounds.   Abdominal:      General: Bowel sounds are normal.      Palpations: Abdomen is soft.   Genitourinary:     Comments: Deferred to surgeon   Musculoskeletal:         General: Normal range of motion.      Cervical back: Normal range of motion and neck supple.   Skin:     General: Skin is warm and dry.   Neurological:      General: No focal deficit present.      Mental Status: He is alert and oriented to person, place, and time. Mental status is at baseline.   Psychiatric:         Mood and Affect: Mood normal.         Behavior: Behavior normal.         Thought Content: Thought content normal.         Judgment: " Judgment normal.        PAT AIRWAY:   Airway:     Mallampati::  I    TM distance::  >3 FB    Neck ROM::  Full  normal      ASA: 2  DASI: 42.7  METS: 8  CHADS: 1.9%  RCRI: 0.4%  STOP BAN    Assessment and Plan:     External Hemorrhoid, colon cancer screening: excision hemorrhoid, colonoscopy.   Hyperlipidemia: Pt is taking atorvastatin.  COPD: Pt is using Breo Ellipta inhaler daily. Pt stated she does not use his albuterol nebulizer or have a current albuterol inhaler.   H/O leukocytosis: pt completed a workup with Dr Penny last year. No problems were found. Pt was told the cause may be from cigarette smoking and steroid back injections he had been getting at that time. Dr Penny instructed him to stop  smoking. Physician note in Epic Notes.  Daily Cigarette smoker   BMI: 30.42    Checking CBC in PAT.     Kristine Pringle APRN-CNP

## 2024-03-22 LAB
ATRIAL RATE: 94 BPM
P AXIS: 66 DEGREES
P OFFSET: 178 MS
P ONSET: 124 MS
PR INTERVAL: 192 MS
Q ONSET: 220 MS
QRS COUNT: 16 BEATS
QRS DURATION: 102 MS
QT INTERVAL: 352 MS
QTC CALCULATION(BAZETT): 440 MS
QTC FREDERICIA: 408 MS
R AXIS: 59 DEGREES
T AXIS: 65 DEGREES
T OFFSET: 396 MS
VENTRICULAR RATE: 94 BPM

## 2024-04-04 ENCOUNTER — TELEPHONE (OUTPATIENT)
Dept: PRIMARY CARE | Facility: CLINIC | Age: 64
End: 2024-04-04
Payer: COMMERCIAL

## 2024-04-04 ENCOUNTER — OFFICE VISIT (OUTPATIENT)
Dept: PRIMARY CARE | Facility: CLINIC | Age: 64
End: 2024-04-04
Payer: MEDICARE

## 2024-04-04 VITALS
BODY MASS INDEX: 30.92 KG/M2 | DIASTOLIC BLOOD PRESSURE: 76 MMHG | WEIGHT: 216 LBS | SYSTOLIC BLOOD PRESSURE: 121 MMHG | TEMPERATURE: 98.1 F | HEIGHT: 70 IN | OXYGEN SATURATION: 96 % | HEART RATE: 81 BPM

## 2024-04-04 DIAGNOSIS — J98.01 BRONCHOSPASM: ICD-10-CM

## 2024-04-04 DIAGNOSIS — J01.20 SUBACUTE ETHMOIDAL SINUSITIS: Primary | ICD-10-CM

## 2024-04-04 DIAGNOSIS — R05.1 ACUTE COUGH: ICD-10-CM

## 2024-04-04 PROBLEM — F19.21 HISTORY OF SUBSTANCE DEPENDENCE (MULTI): Status: ACTIVE | Noted: 2023-01-29

## 2024-04-04 PROBLEM — R74.01 ELEVATION OF LEVELS OF LIVER TRANSAMINASE LEVELS: Status: ACTIVE | Noted: 2023-09-08

## 2024-04-04 PROBLEM — M54.16 LUMBAR RADICULOPATHY: Status: ACTIVE | Noted: 2023-09-08

## 2024-04-04 PROBLEM — R73.9 HYPERGLYCEMIA, UNSPECIFIED: Status: ACTIVE | Noted: 2023-07-11

## 2024-04-04 PROBLEM — Z20.5 CONTACT WITH AND (SUSPECTED) EXPOSURE TO VIRAL HEPATITIS: Status: ACTIVE | Noted: 2023-10-16

## 2024-04-04 PROBLEM — J43.9 PULMONARY EMPHYSEMA (MULTI): Status: ACTIVE | Noted: 2023-09-08

## 2024-04-04 PROBLEM — J45.909 REACTIVE AIRWAY DISEASE (HHS-HCC): Status: ACTIVE | Noted: 2023-09-08

## 2024-04-04 PROBLEM — Z86.39 HISTORY OF HYPERCHOLESTEROLEMIA: Status: ACTIVE | Noted: 2024-04-04

## 2024-04-04 PROBLEM — K76.0 STEATOSIS OF LIVER: Status: ACTIVE | Noted: 2023-03-09

## 2024-04-04 PROBLEM — B00.9 RECURRENT HERPES SIMPLEX: Status: ACTIVE | Noted: 2024-04-04

## 2024-04-04 PROBLEM — Z72.0 TOBACCO USE: Status: ACTIVE | Noted: 2023-01-29

## 2024-04-04 PROBLEM — M51.26 HERNIATED LUMBAR INTERVERTEBRAL DISC: Status: ACTIVE | Noted: 2023-09-08

## 2024-04-04 PROBLEM — F17.200 CURRENT SMOKER: Status: ACTIVE | Noted: 2023-09-08

## 2024-04-04 PROBLEM — N52.9 ERECTILE DYSFUNCTION ASSOCIATED WITH VASCULOPATHY: Status: ACTIVE | Noted: 2024-04-04

## 2024-04-04 PROBLEM — K64.5 THROMBOSED EXTERNAL HEMORRHOIDS: Status: ACTIVE | Noted: 2023-12-15

## 2024-04-04 PROBLEM — R89.8 EOSINOPHIL COUNT RAISED: Status: ACTIVE | Noted: 2023-03-09

## 2024-04-04 PROBLEM — J30.9 ALLERGIC RHINITIS: Status: ACTIVE | Noted: 2024-04-04

## 2024-04-04 PROCEDURE — 99213 OFFICE O/P EST LOW 20 MIN: CPT | Performed by: INTERNAL MEDICINE

## 2024-04-04 PROCEDURE — 1125F AMNT PAIN NOTED PAIN PRSNT: CPT | Performed by: INTERNAL MEDICINE

## 2024-04-04 PROCEDURE — 87634 RSV DNA/RNA AMP PROBE: CPT | Performed by: INTERNAL MEDICINE

## 2024-04-04 PROCEDURE — 1159F MED LIST DOCD IN RCRD: CPT | Performed by: INTERNAL MEDICINE

## 2024-04-04 RX ORDER — ALBUTEROL SULFATE 90 UG/1
1-2 AEROSOL, METERED RESPIRATORY (INHALATION) EVERY 6 HOURS PRN
Qty: 18 G | Refills: 1 | Status: SHIPPED | OUTPATIENT
Start: 2024-04-04

## 2024-04-04 RX ORDER — METHYLPREDNISOLONE 4 MG/1
TABLET ORAL
Qty: 21 TABLET | Refills: 0 | Status: SHIPPED | OUTPATIENT
Start: 2024-04-04 | End: 2024-04-11

## 2024-04-04 RX ORDER — BENZONATATE 200 MG/1
200 CAPSULE ORAL 2 TIMES DAILY
Qty: 180 CAPSULE | Refills: 1 | Status: SHIPPED | OUTPATIENT
Start: 2024-04-04

## 2024-04-04 RX ORDER — AMOXICILLIN AND CLAVULANATE POTASSIUM 875; 125 MG/1; MG/1
875 TABLET, FILM COATED ORAL 2 TIMES DAILY
Qty: 14 TABLET | Refills: 0 | Status: SHIPPED | OUTPATIENT
Start: 2024-04-04 | End: 2024-04-14

## 2024-04-04 ASSESSMENT — ENCOUNTER SYMPTOMS: COUGH: 1

## 2024-04-04 ASSESSMENT — PAIN SCALES - GENERAL: PAINLEVEL: 2

## 2024-04-04 NOTE — TELEPHONE ENCOUNTER
Pt states his girlfriend tested positive for influenza A on 4/2/24.  Pt also has symptoms that started 4/2/24 and today c/o cough, lightheadedness, chills, headache, sore throat, sinus drainage.  States COVID test this morning was negative.  Has been using Dayquil.  Asking what he needs to do, is he a presumed positive for influenza A or does he need to come in to get tested?  Please advise.  Ph: [phone]

## 2024-04-04 NOTE — H&P (VIEW-ONLY)
[facility]: [facility]  PROGRESS NOTE      Gregor Finney is a 64 y.o. male that is presenting today for Cough (Running nose and scratchy throat, now has a bad cough x3 days. ).    Assessment/Plan   Diagnoses and all orders for this visit:  Subacute ethmoidal sinusitis  -     amoxicillin-pot clavulanate (Augmentin) 875-125 mg tablet; Take 1 tablet (875 mg) by mouth 2 times a day for 10 days.  -     methylPREDNISolone (Medrol Dospak) 4 mg tablets; Take as directed on package.  Acute cough  -     RSV PCR  -     Influenza A, and B PCR; Future  -     Sars-CoV-2 and Influenza A/B PCR  -     methylPREDNISolone (Medrol Dospak) 4 mg tablets; Take as directed on package.  -     albuterol 90 mcg/actuation inhaler; Inhale 1-2 puffs every 6 hours if needed for wheezing.  Bronchospasm  -     albuterol 90 mcg/actuation inhaler; Inhale 1-2 puffs every 6 hours if needed for wheezing.  Chronic cough  -     benzonatate (Tessalon) 200 mg capsule; Take 1 capsule (200 mg) by mouth 2 times a day. Do not crush or chew.  Subjective   - Patient is here today for sick visit, been cough        - Patient denies any other symptoms or concerns at this time.    - patient denies any adverse reactions to or concerns with his/her meds.      Cough  This is a new problem. The current episode started yesterday. The problem has been unchanged. The problem occurs hourly. The cough is Non-productive. Associated symptoms include chest pain (when coughing), ear congestion, headaches, nasal congestion, postnasal drip and wheezing. Pertinent negatives include no chills, ear pain, fever, heartburn, hemoptysis, myalgias, rash, rhinorrhea, sore throat, shortness of breath, sweats or weight loss. The symptoms are aggravated by lying down. Risk factors: None. He has tried OTC cough suppressant for the symptoms. The treatment provided no relief.     Review of Systems   Constitutional:  Negative for chills, fever and weight loss.   HENT:   Positive for postnasal drip. Negative for ear pain, rhinorrhea and sore throat.    Respiratory:  Positive for cough and wheezing. Negative for hemoptysis and shortness of breath.    Cardiovascular:  Positive for chest pain (when coughing).   Gastrointestinal:  Negative for heartburn.   Musculoskeletal:  Negative for myalgias.   Skin:  Negative for rash.   Neurological:  Positive for headaches.      Objective   Vitals:    04/04/24 1604   BP: 121/76   Pulse: 81   Temp: 36.7 °C (98.1 °F)   SpO2: 96%      Body mass index is 30.99 kg/m².  Physical Exam  Vitals and nursing note reviewed.   Constitutional:       Appearance: Normal appearance.   HENT:      Head: Normocephalic and atraumatic.      Right Ear: Tympanic membrane, ear canal and external ear normal.      Left Ear: Tympanic membrane, ear canal and external ear normal.      Nose: Nose normal.      Comments: Ethmoid sinus tenderness  Neck:      Vascular: No carotid bruit.   Cardiovascular:      Rate and Rhythm: Normal rate and regular rhythm.      Pulses: Normal pulses.      Heart sounds: Normal heart sounds.   Pulmonary:      Effort: Pulmonary effort is normal.      Breath sounds: Normal breath sounds.   Abdominal:      General: Abdomen is flat. Bowel sounds are normal.      Palpations: Abdomen is soft.   Musculoskeletal:         General: No swelling. Normal range of motion.      Cervical back: Neck supple.   Lymphadenopathy:      Cervical: No cervical adenopathy.   Skin:     General: Skin is warm and dry.   Neurological:      Mental Status: He is alert.   Psychiatric:         Mood and Affect: Mood normal.       Diagnostic Results   Lab Results   Component Value Date    GLUCOSE 108 (H) 07/11/2023    CALCIUM 9.6 07/11/2023     07/11/2023    K 4.6 07/11/2023    CO2 23 (L) 07/11/2023     07/11/2023    BUN 21 07/11/2023    CREATININE 1.2 07/11/2023     Lab Results   Component Value Date    ALT 24 10/16/2023    AST 17 10/16/2023    ALKPHOS 109 10/16/2023     "BILITOT 0.4 10/16/2023     Lab Results   Component Value Date    WBC 17.9 (H) 03/21/2024    HGB 15.2 03/21/2024    HCT 46.6 03/21/2024    MCV 97 03/21/2024     03/21/2024     Lab Results   Component Value Date    CHOL 150 07/11/2023     Lab Results   Component Value Date    HDL 33 (L) 07/11/2023     Lab Results   Component Value Date    LDLCALC 85 07/11/2023     Lab Results   Component Value Date    TRIG 160 (H) 07/11/2023     No components found for: \"CHOLHDL\"  Lab Results   Component Value Date    HGBA1C 5.8 07/11/2023     Other labs not included in the list above were reviewed either before or during this encounter.    History    Past Medical History:   Diagnosis Date    Adverse effect of anesthesia     Chronic cough     COPD (chronic obstructive pulmonary disease) (CMS/HCC)     Fatty liver     GERD (gastroesophageal reflux disease)     H/O leukocytosis     H/O thrombocytosis     Hyperlipidemia     Personal history of other endocrine, nutritional and metabolic disease     History of hypercholesterolemia     Past Surgical History:   Procedure Laterality Date    BACK SURGERY      BLEPHAROPLASTY Bilateral     bilateral upper blepharoplasty    OTHER SURGICAL HISTORY  09/16/2019    Nasal septoplasty    OTHER SURGICAL HISTORY Left 09/16/2019    Knee surgery    OTHER SURGICAL HISTORY Left 09/16/2019    Elbow surgery     Family History   Problem Relation Name Age of Onset    COPD Mother      Emphysema Mother      Stroke Other      Diabetes Other      Cancer Other      Heart attack Other       Social History     Socioeconomic History    Marital status:      Spouse name: Not on file    Number of children: Not on file    Years of education: Not on file    Highest education level: Not on file   Occupational History    Not on file   Tobacco Use    Smoking status: Every Day     Packs/day: 1.00     Years: 53.00     Additional pack years: 0.00     Total pack years: 53.00     Types: Cigarettes     Start date: 1971 "     Passive exposure: Current    Smokeless tobacco: Never   Vaping Use    Vaping Use: Never used   Substance and Sexual Activity    Alcohol use: Yes     Comment: sometimes    Drug use: Never    Sexual activity: Never   Other Topics Concern    Not on file   Social History Narrative    Not on file     Social Determinants of Health     Financial Resource Strain: Not on file   Food Insecurity: Not on file   Transportation Needs: Not on file   Physical Activity: Not on file   Stress: Not on file   Social Connections: Not on file   Intimate Partner Violence: Not on file   Housing Stability: Not on file     Allergies   Allergen Reactions    Bupropion Agitation    Levofloxacin Other     foot, knee pain    Sulfa (Sulfonamide Antibiotics) Rash and Unknown    Tetracycline Rash     Current Outpatient Medications on File Prior to Visit   Medication Sig Dispense Refill    albuterol 2.5 mg /3 mL (0.083 %) nebulizer solution Take 3 mL (2.5 mg) by nebulization every 6 hours if needed for wheezing or shortness of breath. 100 mL 1    albuterol 90 mcg/actuation inhaler Inhale 1-2 puffs every 6 hours if needed for wheezing. 18 g 1    ascorbic acid (Vitamin C) 250 mg tablet Take 1 tablet (250 mg) by mouth once daily. TAKE 1 TABLET DAILY.      atorvastatin (Lipitor) 20 mg tablet Take 1 tablet (20 mg) by mouth once daily.  1 tablet Orally Once a day for 90 day(s) 90 tablet 1    benzonatate (Tessalon) 200 mg capsule Take 1 capsule (200 mg) by mouth 2 times a day. Do not crush or chew. 180 capsule 3    cholecalciferol (Vitamin D3) 25 MCG (1000 UT) tablet Take 1 capsule by mouth once daily.  1 capsule Orally Once a day for 30 day(s)      famciclovir (Famvir) 125 mg tablet TAKE 2 TABLETS (250 MG) BY MOUTH 2 TIMES A DAY. (Patient taking differently: Take 2 tablets (250 mg) by mouth once daily as needed.) 60 tablet 1    fluticasone furoate-vilanteroL (Breo Ellipta) 100-25 mcg/dose inhaler Inhale 1 puff once daily.  1 puff Inhalation Once a day  for 90 days 3 each 1    ibuprofen 800 mg tablet Take 1 tablet (800 mg) by mouth 3 times a day as needed.  1 tablet Orally Three times a day as needed( not every day) for 30 days      promethazine (Phenergan) 6.25 mg/5 mL syrup TAKE 1 MILLILITER BY MOUTH EVERY 6 HOURS AS NEEDED 360 mL 1    sildenafil (Viagra) 100 mg tablet Take 1 tablet (100 mg) by mouth once daily as needed for erectile dysfunction. 10 tablet 5    ZINC GLUCONATE ORAL Take 1 tablet by mouth once daily.  TAKE 1 TABLET DAILY.      naproxen sodium (Aleve) 220 mg tablet Take 1 tablet (220 mg) by mouth once daily as needed.  ONE TABLET DAILY PRN      nitroglycerin in white petrolatum topical jelly Apply a pea-sized amount to anterior anus 3 times a day (Patient not taking: Reported on 4/4/2024) 60 g 1     No current facility-administered medications on file prior to visit.     Immunization History   Administered Date(s) Administered    DTaP vaccine, pediatric  (INFANRIX) 03/28/2012    Flu vaccine (IIV4), preservative free *Check age/dose* 10/12/2020, 10/06/2021, 09/20/2022    Flu vaccine, quadrivalent, high-dose, preservative free, age 65y+ (FLUZONE) 10/16/2023    Hep A / Hep B 09/03/2021    Hepatitis A vaccine, age 19 years and greater (HAVRIX) 09/03/2021    Hepatitis B vaccine, adult (RECOMBIVAX, ENGERIX) 09/03/2021, 11/10/2021    Moderna COVID-19 vaccine, bivalent, blue cap/gray label *Check age/dose* 11/18/2022    Moderna SARS-CoV-2 Vaccination 04/07/2021, 05/07/2021, 01/17/2022, 07/16/2022    Pneumococcal conjugate vaccine, 13-valent (PREVNAR 13) 11/19/2019    Pneumococcal conjugate vaccine, 20-valent (PREVNAR 20) 01/10/2024    Pneumococcal polysaccharide vaccine, 23-valent, age 2 years and older (PNEUMOVAX 23) 10/02/2018    Td vaccine, age 7 years and older (TDVAX) 01/01/2000    Zoster vaccine, recombinant, adult (SHINGRIX) 11/19/2019     Patient's medical history was reviewed and updated either before or during this encounter.       Pardeep TAMAYO  MD [place]

## 2024-04-04 NOTE — PROGRESS NOTES
[facility]: [facility]  PROGRESS NOTE      Gregor Finney is a 64 y.o. male that is presenting today for Cough (Running nose and scratchy throat, now has a bad cough x3 days. ).    Assessment/Plan   Diagnoses and all orders for this visit:  Subacute ethmoidal sinusitis  -     amoxicillin-pot clavulanate (Augmentin) 875-125 mg tablet; Take 1 tablet (875 mg) by mouth 2 times a day for 10 days.  -     methylPREDNISolone (Medrol Dospak) 4 mg tablets; Take as directed on package.  Acute cough  -     RSV PCR  -     Influenza A, and B PCR; Future  -     Sars-CoV-2 and Influenza A/B PCR  -     methylPREDNISolone (Medrol Dospak) 4 mg tablets; Take as directed on package.  -     albuterol 90 mcg/actuation inhaler; Inhale 1-2 puffs every 6 hours if needed for wheezing.  Bronchospasm  -     albuterol 90 mcg/actuation inhaler; Inhale 1-2 puffs every 6 hours if needed for wheezing.  Chronic cough  -     benzonatate (Tessalon) 200 mg capsule; Take 1 capsule (200 mg) by mouth 2 times a day. Do not crush or chew.  Subjective   - Patient is here today for sick visit, been cough        - Patient denies any other symptoms or concerns at this time.    - patient denies any adverse reactions to or concerns with his/her meds.      Cough  This is a new problem. The current episode started yesterday. The problem has been unchanged. The problem occurs hourly. The cough is Non-productive. Associated symptoms include chest pain (when coughing), ear congestion, headaches, nasal congestion, postnasal drip and wheezing. Pertinent negatives include no chills, ear pain, fever, heartburn, hemoptysis, myalgias, rash, rhinorrhea, sore throat, shortness of breath, sweats or weight loss. The symptoms are aggravated by lying down. Risk factors: None. He has tried OTC cough suppressant for the symptoms. The treatment provided no relief.     Review of Systems   Constitutional:  Negative for chills, fever and weight loss.   HENT:   Positive for postnasal drip. Negative for ear pain, rhinorrhea and sore throat.    Respiratory:  Positive for cough and wheezing. Negative for hemoptysis and shortness of breath.    Cardiovascular:  Positive for chest pain (when coughing).   Gastrointestinal:  Negative for heartburn.   Musculoskeletal:  Negative for myalgias.   Skin:  Negative for rash.   Neurological:  Positive for headaches.      Objective   Vitals:    04/04/24 1604   BP: 121/76   Pulse: 81   Temp: 36.7 °C (98.1 °F)   SpO2: 96%      Body mass index is 30.99 kg/m².  Physical Exam  Vitals and nursing note reviewed.   Constitutional:       Appearance: Normal appearance.   HENT:      Head: Normocephalic and atraumatic.      Right Ear: Tympanic membrane, ear canal and external ear normal.      Left Ear: Tympanic membrane, ear canal and external ear normal.      Nose: Nose normal.      Comments: Ethmoid sinus tenderness  Neck:      Vascular: No carotid bruit.   Cardiovascular:      Rate and Rhythm: Normal rate and regular rhythm.      Pulses: Normal pulses.      Heart sounds: Normal heart sounds.   Pulmonary:      Effort: Pulmonary effort is normal.      Breath sounds: Normal breath sounds.   Abdominal:      General: Abdomen is flat. Bowel sounds are normal.      Palpations: Abdomen is soft.   Musculoskeletal:         General: No swelling. Normal range of motion.      Cervical back: Neck supple.   Lymphadenopathy:      Cervical: No cervical adenopathy.   Skin:     General: Skin is warm and dry.   Neurological:      Mental Status: He is alert.   Psychiatric:         Mood and Affect: Mood normal.       Diagnostic Results   Lab Results   Component Value Date    GLUCOSE 108 (H) 07/11/2023    CALCIUM 9.6 07/11/2023     07/11/2023    K 4.6 07/11/2023    CO2 23 (L) 07/11/2023     07/11/2023    BUN 21 07/11/2023    CREATININE 1.2 07/11/2023     Lab Results   Component Value Date    ALT 24 10/16/2023    AST 17 10/16/2023    ALKPHOS 109 10/16/2023     "BILITOT 0.4 10/16/2023     Lab Results   Component Value Date    WBC 17.9 (H) 03/21/2024    HGB 15.2 03/21/2024    HCT 46.6 03/21/2024    MCV 97 03/21/2024     03/21/2024     Lab Results   Component Value Date    CHOL 150 07/11/2023     Lab Results   Component Value Date    HDL 33 (L) 07/11/2023     Lab Results   Component Value Date    LDLCALC 85 07/11/2023     Lab Results   Component Value Date    TRIG 160 (H) 07/11/2023     No components found for: \"CHOLHDL\"  Lab Results   Component Value Date    HGBA1C 5.8 07/11/2023     Other labs not included in the list above were reviewed either before or during this encounter.    History    Past Medical History:   Diagnosis Date    Adverse effect of anesthesia     Chronic cough     COPD (chronic obstructive pulmonary disease) (CMS/HCC)     Fatty liver     GERD (gastroesophageal reflux disease)     H/O leukocytosis     H/O thrombocytosis     Hyperlipidemia     Personal history of other endocrine, nutritional and metabolic disease     History of hypercholesterolemia     Past Surgical History:   Procedure Laterality Date    BACK SURGERY      BLEPHAROPLASTY Bilateral     bilateral upper blepharoplasty    OTHER SURGICAL HISTORY  09/16/2019    Nasal septoplasty    OTHER SURGICAL HISTORY Left 09/16/2019    Knee surgery    OTHER SURGICAL HISTORY Left 09/16/2019    Elbow surgery     Family History   Problem Relation Name Age of Onset    COPD Mother      Emphysema Mother      Stroke Other      Diabetes Other      Cancer Other      Heart attack Other       Social History     Socioeconomic History    Marital status:      Spouse name: Not on file    Number of children: Not on file    Years of education: Not on file    Highest education level: Not on file   Occupational History    Not on file   Tobacco Use    Smoking status: Every Day     Packs/day: 1.00     Years: 53.00     Additional pack years: 0.00     Total pack years: 53.00     Types: Cigarettes     Start date: 1971 "     Passive exposure: Current    Smokeless tobacco: Never   Vaping Use    Vaping Use: Never used   Substance and Sexual Activity    Alcohol use: Yes     Comment: sometimes    Drug use: Never    Sexual activity: Never   Other Topics Concern    Not on file   Social History Narrative    Not on file     Social Determinants of Health     Financial Resource Strain: Not on file   Food Insecurity: Not on file   Transportation Needs: Not on file   Physical Activity: Not on file   Stress: Not on file   Social Connections: Not on file   Intimate Partner Violence: Not on file   Housing Stability: Not on file     Allergies   Allergen Reactions    Bupropion Agitation    Levofloxacin Other     foot, knee pain    Sulfa (Sulfonamide Antibiotics) Rash and Unknown    Tetracycline Rash     Current Outpatient Medications on File Prior to Visit   Medication Sig Dispense Refill    albuterol 2.5 mg /3 mL (0.083 %) nebulizer solution Take 3 mL (2.5 mg) by nebulization every 6 hours if needed for wheezing or shortness of breath. 100 mL 1    albuterol 90 mcg/actuation inhaler Inhale 1-2 puffs every 6 hours if needed for wheezing. 18 g 1    ascorbic acid (Vitamin C) 250 mg tablet Take 1 tablet (250 mg) by mouth once daily. TAKE 1 TABLET DAILY.      atorvastatin (Lipitor) 20 mg tablet Take 1 tablet (20 mg) by mouth once daily.  1 tablet Orally Once a day for 90 day(s) 90 tablet 1    benzonatate (Tessalon) 200 mg capsule Take 1 capsule (200 mg) by mouth 2 times a day. Do not crush or chew. 180 capsule 3    cholecalciferol (Vitamin D3) 25 MCG (1000 UT) tablet Take 1 capsule by mouth once daily.  1 capsule Orally Once a day for 30 day(s)      famciclovir (Famvir) 125 mg tablet TAKE 2 TABLETS (250 MG) BY MOUTH 2 TIMES A DAY. (Patient taking differently: Take 2 tablets (250 mg) by mouth once daily as needed.) 60 tablet 1    fluticasone furoate-vilanteroL (Breo Ellipta) 100-25 mcg/dose inhaler Inhale 1 puff once daily.  1 puff Inhalation Once a day  for 90 days 3 each 1    ibuprofen 800 mg tablet Take 1 tablet (800 mg) by mouth 3 times a day as needed.  1 tablet Orally Three times a day as needed( not every day) for 30 days      promethazine (Phenergan) 6.25 mg/5 mL syrup TAKE 1 MILLILITER BY MOUTH EVERY 6 HOURS AS NEEDED 360 mL 1    sildenafil (Viagra) 100 mg tablet Take 1 tablet (100 mg) by mouth once daily as needed for erectile dysfunction. 10 tablet 5    ZINC GLUCONATE ORAL Take 1 tablet by mouth once daily.  TAKE 1 TABLET DAILY.      naproxen sodium (Aleve) 220 mg tablet Take 1 tablet (220 mg) by mouth once daily as needed.  ONE TABLET DAILY PRN      nitroglycerin in white petrolatum topical jelly Apply a pea-sized amount to anterior anus 3 times a day (Patient not taking: Reported on 4/4/2024) 60 g 1     No current facility-administered medications on file prior to visit.     Immunization History   Administered Date(s) Administered    DTaP vaccine, pediatric  (INFANRIX) 03/28/2012    Flu vaccine (IIV4), preservative free *Check age/dose* 10/12/2020, 10/06/2021, 09/20/2022    Flu vaccine, quadrivalent, high-dose, preservative free, age 65y+ (FLUZONE) 10/16/2023    Hep A / Hep B 09/03/2021    Hepatitis A vaccine, age 19 years and greater (HAVRIX) 09/03/2021    Hepatitis B vaccine, adult (RECOMBIVAX, ENGERIX) 09/03/2021, 11/10/2021    Moderna COVID-19 vaccine, bivalent, blue cap/gray label *Check age/dose* 11/18/2022    Moderna SARS-CoV-2 Vaccination 04/07/2021, 05/07/2021, 01/17/2022, 07/16/2022    Pneumococcal conjugate vaccine, 13-valent (PREVNAR 13) 11/19/2019    Pneumococcal conjugate vaccine, 20-valent (PREVNAR 20) 01/10/2024    Pneumococcal polysaccharide vaccine, 23-valent, age 2 years and older (PNEUMOVAX 23) 10/02/2018    Td vaccine, age 7 years and older (TDVAX) 01/01/2000    Zoster vaccine, recombinant, adult (SHINGRIX) 11/19/2019     Patient's medical history was reviewed and updated either before or during this encounter.       Pardeep TAMAYO  MD [place]

## 2024-04-05 LAB
FLUAV RNA RESP QL NAA+PROBE: NOT DETECTED
FLUBV RNA RESP QL NAA+PROBE: NOT DETECTED
RSV RNA RESP QL NAA+PROBE: NOT DETECTED
SARS-COV-2 RNA RESP QL NAA+PROBE: NOT DETECTED

## 2024-04-08 ASSESSMENT — ENCOUNTER SYMPTOMS
WEIGHT LOSS: 0
HEADACHES: 1
RHINORRHEA: 0
HEMOPTYSIS: 0
MYALGIAS: 0
CHILLS: 0
HEARTBURN: 0
SHORTNESS OF BREATH: 0
SWEATS: 0
SORE THROAT: 0
FEVER: 0
WHEEZING: 1

## 2024-04-24 DIAGNOSIS — J43.9 CHRONIC EMPHYSEMA SYNDROME (MULTI): ICD-10-CM

## 2024-04-25 ENCOUNTER — APPOINTMENT (OUTPATIENT)
Dept: GASTROENTEROLOGY | Facility: HOSPITAL | Age: 64
End: 2024-04-25
Payer: MEDICARE

## 2024-04-25 ENCOUNTER — PHARMACY VISIT (OUTPATIENT)
Dept: PHARMACY | Facility: CLINIC | Age: 64
End: 2024-04-25
Payer: MEDICARE

## 2024-04-25 ENCOUNTER — ANESTHESIA (OUTPATIENT)
Dept: OPERATING ROOM | Facility: HOSPITAL | Age: 64
End: 2024-04-25
Payer: MEDICARE

## 2024-04-25 ENCOUNTER — ANESTHESIA EVENT (OUTPATIENT)
Dept: OPERATING ROOM | Facility: HOSPITAL | Age: 64
End: 2024-04-25
Payer: MEDICARE

## 2024-04-25 ENCOUNTER — HOSPITAL ENCOUNTER (OUTPATIENT)
Facility: HOSPITAL | Age: 64
Setting detail: OUTPATIENT SURGERY
Discharge: HOME | End: 2024-04-25
Attending: SURGERY | Admitting: SURGERY
Payer: MEDICARE

## 2024-04-25 VITALS
HEIGHT: 70 IN | DIASTOLIC BLOOD PRESSURE: 77 MMHG | HEART RATE: 100 BPM | BODY MASS INDEX: 30.99 KG/M2 | SYSTOLIC BLOOD PRESSURE: 117 MMHG | RESPIRATION RATE: 16 BRPM | OXYGEN SATURATION: 99 % | TEMPERATURE: 97.5 F

## 2024-04-25 DIAGNOSIS — K64.5 THROMBOSED EXTERNAL HEMORRHOID: ICD-10-CM

## 2024-04-25 DIAGNOSIS — K64.4 EXTERNAL HEMORRHOID: ICD-10-CM

## 2024-04-25 DIAGNOSIS — B00.9 RECURRENT HERPES SIMPLEX: ICD-10-CM

## 2024-04-25 DIAGNOSIS — Z12.11 COLON CANCER SCREENING: ICD-10-CM

## 2024-04-25 DIAGNOSIS — K64.5 THROMBOSED EXTERNAL HEMORRHOIDS: Primary | ICD-10-CM

## 2024-04-25 PROCEDURE — 7100000010 HC PHASE TWO TIME - EACH INCREMENTAL 1 MINUTE: Performed by: SURGERY

## 2024-04-25 PROCEDURE — 45385 COLONOSCOPY W/LESION REMOVAL: CPT | Performed by: SURGERY

## 2024-04-25 PROCEDURE — 88304 TISSUE EXAM BY PATHOLOGIST: CPT | Mod: TC,59 | Performed by: SURGERY

## 2024-04-25 PROCEDURE — 2500000005 HC RX 250 GENERAL PHARMACY W/O HCPCS: Performed by: NURSE ANESTHETIST, CERTIFIED REGISTERED

## 2024-04-25 PROCEDURE — 2500000004 HC RX 250 GENERAL PHARMACY W/ HCPCS (ALT 636 FOR OP/ED): Performed by: ANESTHESIOLOGY

## 2024-04-25 PROCEDURE — 3700000002 HC GENERAL ANESTHESIA TIME - EACH INCREMENTAL 1 MINUTE: Performed by: SURGERY

## 2024-04-25 PROCEDURE — 45385 COLONOSCOPY W/LESION REMOVAL: CPT

## 2024-04-25 PROCEDURE — 88304 TISSUE EXAM BY PATHOLOGIST: CPT | Performed by: PATHOLOGY

## 2024-04-25 PROCEDURE — 7100000009 HC PHASE TWO TIME - INITIAL BASE CHARGE: Performed by: SURGERY

## 2024-04-25 PROCEDURE — 88305 TISSUE EXAM BY PATHOLOGIST: CPT | Performed by: PATHOLOGY

## 2024-04-25 PROCEDURE — 2500000004 HC RX 250 GENERAL PHARMACY W/ HCPCS (ALT 636 FOR OP/ED): Performed by: NURSE ANESTHETIST, CERTIFIED REGISTERED

## 2024-04-25 PROCEDURE — 2500000005 HC RX 250 GENERAL PHARMACY W/O HCPCS: Performed by: SURGERY

## 2024-04-25 PROCEDURE — RXMED WILLOW AMBULATORY MEDICATION CHARGE

## 2024-04-25 PROCEDURE — 46255 REMOVE INT/EXT HEM 1 GROUP: CPT | Performed by: SURGERY

## 2024-04-25 PROCEDURE — 88305 TISSUE EXAM BY PATHOLOGIST: CPT | Mod: TC,59 | Performed by: SURGERY

## 2024-04-25 PROCEDURE — 7100000001 HC RECOVERY ROOM TIME - INITIAL BASE CHARGE: Performed by: SURGERY

## 2024-04-25 PROCEDURE — A45378 PR COLONOSCOPY,DIAGNOSTIC: Performed by: NURSE ANESTHETIST, CERTIFIED REGISTERED

## 2024-04-25 PROCEDURE — 7100000009 HC PHASE TWO TIME - INITIAL BASE CHARGE

## 2024-04-25 PROCEDURE — 2500000002 HC RX 250 W HCPCS SELF ADMINISTERED DRUGS (ALT 637 FOR MEDICARE OP, ALT 636 FOR OP/ED): Performed by: ANESTHESIOLOGY

## 2024-04-25 PROCEDURE — 46946 INT HRHC LIG 2+HROID W/O IMG: CPT

## 2024-04-25 PROCEDURE — 3600000002 HC OR TIME - INITIAL BASE CHARGE - PROCEDURE LEVEL TWO: Performed by: SURGERY

## 2024-04-25 PROCEDURE — 46946 INT HRHC LIG 2+HROID W/O IMG: CPT | Performed by: SURGERY

## 2024-04-25 PROCEDURE — 2720000007 HC OR 272 NO HCPCS: Performed by: SURGERY

## 2024-04-25 PROCEDURE — 7100000010 HC PHASE TWO TIME - EACH INCREMENTAL 1 MINUTE

## 2024-04-25 PROCEDURE — A45378 PR COLONOSCOPY,DIAGNOSTIC: Performed by: ANESTHESIOLOGY

## 2024-04-25 PROCEDURE — 3600000007 HC OR TIME - EACH INCREMENTAL 1 MINUTE - PROCEDURE LEVEL TWO: Performed by: SURGERY

## 2024-04-25 PROCEDURE — 7100000002 HC RECOVERY ROOM TIME - EACH INCREMENTAL 1 MINUTE: Performed by: SURGERY

## 2024-04-25 PROCEDURE — 3700000001 HC GENERAL ANESTHESIA TIME - INITIAL BASE CHARGE: Performed by: SURGERY

## 2024-04-25 PROCEDURE — 46255 REMOVE INT/EXT HEM 1 GROUP: CPT

## 2024-04-25 PROCEDURE — 2500000001 HC RX 250 WO HCPCS SELF ADMINISTERED DRUGS (ALT 637 FOR MEDICARE OP): Performed by: SURGERY

## 2024-04-25 RX ORDER — DIPHENHYDRAMINE HYDROCHLORIDE 50 MG/ML
12.5 INJECTION INTRAMUSCULAR; INTRAVENOUS ONCE AS NEEDED
Status: DISCONTINUED | OUTPATIENT
Start: 2024-04-25 | End: 2024-04-25 | Stop reason: HOSPADM

## 2024-04-25 RX ORDER — TIZANIDINE HYDROCHLORIDE 4 MG/1
4 CAPSULE, GELATIN COATED ORAL 3 TIMES DAILY
Qty: 30 CAPSULE | Refills: 0 | Status: SHIPPED | OUTPATIENT
Start: 2024-04-25 | End: 2024-05-05

## 2024-04-25 RX ORDER — BUPIVACAINE HYDROCHLORIDE 5 MG/ML
INJECTION, SOLUTION PERINEURAL AS NEEDED
Status: DISCONTINUED | OUTPATIENT
Start: 2024-04-25 | End: 2024-04-25 | Stop reason: HOSPADM

## 2024-04-25 RX ORDER — FAMOTIDINE 20 MG/1
20 TABLET, FILM COATED ORAL ONCE
Status: DISCONTINUED | OUTPATIENT
Start: 2024-04-25 | End: 2024-04-30 | Stop reason: HOSPADM

## 2024-04-25 RX ORDER — ALBUTEROL SULFATE 0.83 MG/ML
2.5 SOLUTION RESPIRATORY (INHALATION) ONCE
Status: COMPLETED | OUTPATIENT
Start: 2024-04-25 | End: 2024-04-25

## 2024-04-25 RX ORDER — MEPERIDINE HYDROCHLORIDE 25 MG/ML
12.5 INJECTION INTRAMUSCULAR; INTRAVENOUS; SUBCUTANEOUS EVERY 10 MIN PRN
Status: DISCONTINUED | OUTPATIENT
Start: 2024-04-25 | End: 2024-04-25 | Stop reason: HOSPADM

## 2024-04-25 RX ORDER — ALBUTEROL SULFATE 0.83 MG/ML
2.5 SOLUTION RESPIRATORY (INHALATION) ONCE AS NEEDED
Status: DISCONTINUED | OUTPATIENT
Start: 2024-04-25 | End: 2024-04-25 | Stop reason: HOSPADM

## 2024-04-25 RX ORDER — LABETALOL HYDROCHLORIDE 5 MG/ML
10 INJECTION, SOLUTION INTRAVENOUS ONCE AS NEEDED
Status: DISCONTINUED | OUTPATIENT
Start: 2024-04-25 | End: 2024-04-25 | Stop reason: HOSPADM

## 2024-04-25 RX ORDER — ACETAMINOPHEN 325 MG/1
650 TABLET ORAL EVERY 6 HOURS PRN
Qty: 30 TABLET | Refills: 0 | Status: SHIPPED | OUTPATIENT
Start: 2024-04-25

## 2024-04-25 RX ORDER — HYDRALAZINE HYDROCHLORIDE 20 MG/ML
10 INJECTION INTRAMUSCULAR; INTRAVENOUS EVERY 30 MIN PRN
Status: DISCONTINUED | OUTPATIENT
Start: 2024-04-25 | End: 2024-04-25 | Stop reason: HOSPADM

## 2024-04-25 RX ORDER — KETOROLAC TROMETHAMINE 30 MG/ML
15 INJECTION, SOLUTION INTRAMUSCULAR; INTRAVENOUS ONCE AS NEEDED
Status: DISCONTINUED | OUTPATIENT
Start: 2024-04-25 | End: 2024-04-25 | Stop reason: HOSPADM

## 2024-04-25 RX ORDER — OXYCODONE HCL 10 MG/1
10 TABLET, FILM COATED, EXTENDED RELEASE ORAL ONCE AS NEEDED
Status: DISCONTINUED | OUTPATIENT
Start: 2024-04-25 | End: 2024-04-30 | Stop reason: HOSPADM

## 2024-04-25 RX ORDER — ACETAMINOPHEN 325 MG/1
650 TABLET ORAL ONCE
Status: COMPLETED | OUTPATIENT
Start: 2024-04-25 | End: 2024-04-25

## 2024-04-25 RX ORDER — LIDOCAINE HYDROCHLORIDE AND EPINEPHRINE 10; 10 MG/ML; UG/ML
INJECTION, SOLUTION INFILTRATION; PERINEURAL AS NEEDED
Status: DISCONTINUED | OUTPATIENT
Start: 2024-04-25 | End: 2024-04-25 | Stop reason: HOSPADM

## 2024-04-25 RX ORDER — SODIUM CHLORIDE, SODIUM LACTATE, POTASSIUM CHLORIDE, CALCIUM CHLORIDE 600; 310; 30; 20 MG/100ML; MG/100ML; MG/100ML; MG/100ML
INJECTION, SOLUTION INTRAVENOUS CONTINUOUS PRN
Status: DISCONTINUED | OUTPATIENT
Start: 2024-04-25 | End: 2024-04-25

## 2024-04-25 RX ORDER — DIBUCAINE 1 %
OINTMENT (GRAM) TOPICAL AS NEEDED
Status: DISCONTINUED | OUTPATIENT
Start: 2024-04-25 | End: 2024-04-25 | Stop reason: HOSPADM

## 2024-04-25 RX ORDER — PROPOFOL 10 MG/ML
INJECTION, EMULSION INTRAVENOUS AS NEEDED
Status: DISCONTINUED | OUTPATIENT
Start: 2024-04-25 | End: 2024-04-25

## 2024-04-25 RX ORDER — MIDAZOLAM HYDROCHLORIDE 1 MG/ML
INJECTION, SOLUTION INTRAMUSCULAR; INTRAVENOUS AS NEEDED
Status: DISCONTINUED | OUTPATIENT
Start: 2024-04-25 | End: 2024-04-25

## 2024-04-25 RX ORDER — ONDANSETRON HYDROCHLORIDE 2 MG/ML
4 INJECTION, SOLUTION INTRAVENOUS ONCE AS NEEDED
Status: DISCONTINUED | OUTPATIENT
Start: 2024-04-25 | End: 2024-04-25 | Stop reason: HOSPADM

## 2024-04-25 RX ORDER — LIDOCAINE HYDROCHLORIDE 10 MG/ML
INJECTION INFILTRATION; PERINEURAL AS NEEDED
Status: DISCONTINUED | OUTPATIENT
Start: 2024-04-25 | End: 2024-04-25

## 2024-04-25 RX ORDER — METOCLOPRAMIDE 10 MG/1
10 TABLET ORAL ONCE
Status: DISCONTINUED | OUTPATIENT
Start: 2024-04-25 | End: 2024-04-30 | Stop reason: HOSPADM

## 2024-04-25 RX ORDER — PROPOFOL 10 MG/ML
INJECTION, EMULSION INTRAVENOUS CONTINUOUS PRN
Status: DISCONTINUED | OUTPATIENT
Start: 2024-04-25 | End: 2024-04-25

## 2024-04-25 RX ORDER — OXYCODONE HYDROCHLORIDE 5 MG/1
5 TABLET ORAL EVERY 6 HOURS PRN
Qty: 24 TABLET | Refills: 0 | Status: SHIPPED | OUTPATIENT
Start: 2024-04-25

## 2024-04-25 RX ORDER — TIZANIDINE HYDROCHLORIDE 4 MG/1
4 CAPSULE, GELATIN COATED ORAL 3 TIMES DAILY PRN
Qty: 20 CAPSULE | Refills: 0 | Status: SHIPPED | OUTPATIENT
Start: 2024-04-25

## 2024-04-25 RX ORDER — IBUPROFEN 600 MG/1
600 TABLET ORAL EVERY 6 HOURS PRN
Qty: 30 TABLET | Refills: 0 | Status: SHIPPED | OUTPATIENT
Start: 2024-04-25

## 2024-04-25 RX ORDER — POLYETHYLENE GLYCOL 3350 17 G/17G
17 POWDER, FOR SOLUTION ORAL DAILY PRN
Qty: 238 G | Refills: 0 | Status: SHIPPED | OUTPATIENT
Start: 2024-04-25

## 2024-04-25 RX ADMIN — PROPOFOL 40 MG: 10 INJECTION, EMULSION INTRAVENOUS at 08:06

## 2024-04-25 RX ADMIN — ALBUTEROL SULFATE 2.5 MG: 2.5 SOLUTION RESPIRATORY (INHALATION) at 07:33

## 2024-04-25 RX ADMIN — MIDAZOLAM HYDROCHLORIDE 2 MG: 1 INJECTION, SOLUTION INTRAMUSCULAR; INTRAVENOUS at 07:50

## 2024-04-25 RX ADMIN — PROPOFOL 50 MG: 10 INJECTION, EMULSION INTRAVENOUS at 08:30

## 2024-04-25 RX ADMIN — PROPOFOL 45 MG: 10 INJECTION, EMULSION INTRAVENOUS at 08:55

## 2024-04-25 RX ADMIN — PROPOFOL 50 MG: 10 INJECTION, EMULSION INTRAVENOUS at 08:41

## 2024-04-25 RX ADMIN — MEPERIDINE HYDROCHLORIDE 12.5 MG: 25 INJECTION INTRAMUSCULAR; INTRAVENOUS; SUBCUTANEOUS at 10:02

## 2024-04-25 RX ADMIN — LIDOCAINE HYDROCHLORIDE 5 ML: 10 INJECTION, SOLUTION INFILTRATION; PERINEURAL at 07:59

## 2024-04-25 RX ADMIN — SODIUM CHLORIDE, POTASSIUM CHLORIDE, SODIUM LACTATE AND CALCIUM CHLORIDE: 600; 310; 30; 20 INJECTION, SOLUTION INTRAVENOUS at 07:50

## 2024-04-25 RX ADMIN — ACETAMINOPHEN 650 MG: 325 TABLET ORAL at 06:51

## 2024-04-25 RX ADMIN — PROPOFOL 40 MG: 10 INJECTION, EMULSION INTRAVENOUS at 07:59

## 2024-04-25 RX ADMIN — PROPOFOL 100 MCG/KG/MIN: 10 INJECTION, EMULSION INTRAVENOUS at 07:59

## 2024-04-25 RX ADMIN — MEPERIDINE HYDROCHLORIDE 12.5 MG: 25 INJECTION INTRAMUSCULAR; INTRAVENOUS; SUBCUTANEOUS at 10:13

## 2024-04-25 SDOH — HEALTH STABILITY: MENTAL HEALTH: CURRENT SMOKER: 1

## 2024-04-25 ASSESSMENT — PAIN SCALES - GENERAL
PAINLEVEL_OUTOF10: 0 - NO PAIN
PAIN_LEVEL: 2
PAINLEVEL_OUTOF10: 1
PAINLEVEL_OUTOF10: 0 - NO PAIN

## 2024-04-25 ASSESSMENT — COLUMBIA-SUICIDE SEVERITY RATING SCALE - C-SSRS
1. IN THE PAST MONTH, HAVE YOU WISHED YOU WERE DEAD OR WISHED YOU COULD GO TO SLEEP AND NOT WAKE UP?: NO
2. HAVE YOU ACTUALLY HAD ANY THOUGHTS OF KILLING YOURSELF?: NO
6. HAVE YOU EVER DONE ANYTHING, STARTED TO DO ANYTHING, OR PREPARED TO DO ANYTHING TO END YOUR LIFE?: NO

## 2024-04-25 ASSESSMENT — PAIN - FUNCTIONAL ASSESSMENT
PAIN_FUNCTIONAL_ASSESSMENT: 0-10

## 2024-04-25 NOTE — POST-PROCEDURE NOTE
1050 Patient requests to get up and go to the bathroom. Denies nausea or dizziness at this time. Changes position independently and ambulates with steady gait. States unable to void at this time.    1056 Patient states he was unable to void, states he feels dehydrated since prior to surgery. Patient has LR infusing, given gingerale to drink and cookies. Wife at bedside    1103 Rx to go arrived with medications    1135 VSS. Discussed AVS wit patient and wife, patient states he has a sitz bath at home. Discussed use of pain medications, patient states he will take tylenol and ibuprofen and will use oxycodone if pain is very bad. Understands to take tylenol again at 1pm if needed, wife aware of this as well. Denies questions. Encouraged to call Dr. Soriano with questions. Wife states she did speak with Dr. Soriano after procedure    1140 Up to bathroom, states he was able to void this time without difficulty. IV removed.     1156 Off unit via wheelchair with transport

## 2024-04-25 NOTE — OP NOTE
Excision Hemorrhoid, Colonoscopy Operative Note     Date: 2024  OR Location: [facility]    Name: Gregor Finney, : 1960, Age: 64 y.o., MRN: [id number], Sex: male    Diagnosis  Pre-op Diagnosis     * External hemorrhoid [K64.4]     * Colon cancer screening [Z12.11] Post-op Diagnosis     * External hemorrhoid [K64.4]     * Colon cancer screening [Z12.11]     Procedures  Excision Hemorrhoid, Colonoscopy  22660 - SD HEMORRHOIDECTOMY NTRNL & XTRNL 1 COLUMN/GROUP  1.  Excisional hemorrhoidectomy, internal/external 1 column  2.  Internal hemorrhoidectomy x 2    Surgeons      * Kermit Soriano - Primary    Resident/Fellow/Other Assistant:  Surgeons and Role:  * No surgeons found with a matching role *    Procedure Summary  Anesthesia: Consult  ASA: III  Anesthesia Staff: Anesthesiologist: Elmo Aguayo DO  CRNA: TOÑO Quach-CRNA  Estimated Blood Loss: 10 mL  Intra-op Medications:   Administrations occurring from 0730 to 0900 on 24:   Medication Name Total Dose   lidocaine-epinephrine (Xylocaine W/EPI) 1 %-1:100,000 injection 10 mL   BUPivacaine HCl (Marcaine) 0.5 % (5 mg/mL) injection 10 mL   albuterol 2.5 mg /3 mL (0.083 %) nebulizer solution 2.5 mg 2.5 mg              Anesthesia Record               Intraprocedure I/O Totals          Intake    Propofol Drip 0.00 mL    The total shown is the total volume documented since Anesthesia Start was filed.    Total Intake 0 mL          Specimen:   ID Type Source Tests Collected by Time   1 : posterior internal/external hemorrhoid Tissue HEMORRHOID SURGICAL PATHOLOGY EXAM Kermit Soriano MD 2024 0848   2 : left lateral internal hemorrhoid Tissue HEMORRHOID SURGICAL PATHOLOGY EXAM Kermit Soriano MD 2024 0856   3 : anterior midline internal hemorrhoid Tissue HEMORRHOID SURGICAL PATHOLOGY EXAM Kermit Soriano MD 2024 0859        Staff:   Circulator: Eriberto Kim RN  Scrub Person: Andrea Vu RN; Trell Lui RN         Drains  and/or Catheters: * None in log *    Tourniquet Times:         Implants:     Findings: Edematous hemorrhoidal skin tag beginning in the left posterior quadrant wrapping to the right posterior quadrant involving the right internal hemorrhoid.  Grade 3 internal hemorrhoids.  8 mm sessile polyps in the proximal transverse colon and sigmoid colon excised with cold snare.  Pandiverticulosis seen.    Indications: Gregor Finney is an 64 y.o. male who is having surgery for External hemorrhoid [K64.4]  Colon cancer screening [Z12.11].  History of thrombosed hemorrhoid which resulted in the large edematous skin tag.  On preoperative exam this morning patient continued denied melena or hematochezia but reported ongoing symptoms and confirmed he would like all hemorrhoids encountered dealt with surgically.  Has never had screening colonoscopy.  To be done prior to hemorrhoidectomy.    The patient was seen in the preoperative area. The risks, benefits, complications, treatment options, non-operative alternatives, expected recovery and outcomes were discussed with the patient. The possibilities of reaction to medication, pulmonary aspiration, injury to surrounding structures, bleeding, recurrent infection, the need for additional procedures, failure to diagnose a condition, and creating a complication requiring transfusion or operation were discussed with the patient. The patient concurred with the proposed plan, giving informed consent.  The site of surgery was properly noted/marked if necessary per policy. The patient has been actively warmed in preoperative area. Preoperative antibiotics are not indicated. Venous thrombosis prophylaxis have been ordered including bilateral sequential compression devices    Procedure Details: Patient was identified in the preoperative area and transported to the operating room.  General anesthesia was induced on the cart and colonoscopy was done which will be dictated separately.  There  were 2 polyps removed and pandiverticulosis seen  Once completed,  they were repositioned in modified lithotomy with appropriate padding on the OR table.  SCDs were placed. Perianal region was prepped and draped in the usual sterile fashion.  Time-out was performed confirming patient procedure perioperative criteria.  Perianal block was performed with 0.25% Marcaine 1% lidocaine using a total of 20 cubic centimeters.  On external exam there was a single large hemorrhoidal skin tag beginning left posterior quadrant wrapping to the right posterior quadrant and 3 columns of large grade 3 internal hemorrhoids.  Hemorrhoidal skin tag was elevated and sharply incised to the internal hemorrhoid which was taken with energy.  Resulting wound was closed with a 2-0 Vicryl from the internal hemorrhoid base to the dentate line.  The skin was reapproximated with a running 3-0 chromic.  Attention was turned to the left lateral quadrant and grade 3 internal hemorrhoid was elevated and sharply excised.  Wound was closed with a running 2-0 Vicryl.  There was 1 additional grade 3 internal hemorrhoid in the anterior midline which was also elevated, sharply excised, closed with a running 2-0 Vicryl.  Operative field was confirmed hemostatic.  A rolled Gelfoam was placed within the anal canal followed by 4 x 4 gauze and mesh underwear.  Patient was extubated and transferred the PACU in stable condition.   Complications:  None; patient tolerated the procedure well.    Disposition: PACU - hemodynamically stable.  Condition: stable         Additional Details:     Attending Attestation: I was present and scrubbed for the entire procedure.    Kermit Soriano  Phone Number: [phone]

## 2024-04-25 NOTE — ANESTHESIA PREPROCEDURE EVALUATION
Patient: Gregor Finney    Procedure Information       Date/Time: 04/25/24 0730    Procedure: Excision Hemorrhoid, Colonoscopy - Pediatric colonoscope for colonoscopy on cart prior to surgery/Prone    [facility] / [facility]    Surgeons: Kermit Soriano MD            Relevant Problems   Cardiac   (+) Pure hypercholesterolemia      Pulmonary   (+) Chronic emphysema syndrome (Multi)   (+) Chronic obstructive pulmonary disease (Multi)   (+) Pulmonary emphysema (Multi)      Neuro   (+) Cervical radiculitis   (+) Lumbar radiculopathy   (+) Radiculopathy, lumbar region      GI   (+) Gastroesophageal reflux disease      Liver   (+) Hepatic steatosis   (+) Steatosis of liver      Endocrine   (+) Hypothyroidism      Musculoskeletal   (+) Displacement of lumbar intervertebral disc   (+) Herniated lumbar intervertebral disc   (+) Spinal stenosis of lumbar region with neurogenic claudication      ID   (+) Recurrent herpes simplex       Clinical information reviewed:    Allergies  Meds               NPO Detail:  NPO/Void Status  Date of Last Liquid: 04/24/24  Time of Last Liquid: 1500  Date of Last Solid: 04/23/24  Time of Last Solid: 1800         Physical Exam    Airway  Mallampati: II  TM distance: >3 FB  Neck ROM: full     Cardiovascular   Rhythm: regular  Rate: normal     Dental - normal exam     Pulmonary   Breath sounds clear to auscultation     Abdominal   Abdomen: soft             Anesthesia Plan    History of general anesthesia?: yes  History of complications of general anesthesia?: no    ASA 3     general     The patient is a current smoker.    intravenous induction   Postoperative administration of opioids is intended.  Anesthetic plan and risks discussed with patient.    Plan discussed with CRNA, attending and CAA.

## 2024-04-25 NOTE — DISCHARGE INSTRUCTIONS
Notify Surgeon If you experience any fever, excessive fatigue, persistent nausea or vomiting, inability to urinate over 8 hours, constipation unresolved by laxatives, unexpected opening of wounds, drainage from your wounds, extensive bruising or swelling.     You may drive if you are off opioid pain medications and are able to perform the activities needed to drive safely.     Avoid strenuous activities that bend or flex the skin and muscle near your incision until after your follow-up visit.     A bland diet is preferred for the first meal after surgery. If there is no nausea or discomfort, you may resume your pre-surgery diet. Avoid alcohol or recreational medications in the first 24 hours. Fiber is important to have regular bowel movements after surgery. This can be accomplished through fiber rich cereals or supplements (Psyllium, Metamucil, and Citrucel). Stay well hydrated.     You will leave the hospital with a cotton gauze dressing over top the incision sites and may have small amount of packing within the anal canal.  The packing can come out with your first bowel movement or on day 1.  These can stay until the morning after your procedure and can be removed while sitting in a sitz bath or during the first use of the bathroom. You may have additional foreign materal such as suture or silicone tubing. Please do not remove this unless asked specifically to do so.     Walking and activities of self-care are encouraged immediately after surgery. Do make any important decisions or sign any legal documents the day of surgery. You may drive if you are off opioid pain medications and are able to perform the activities needed to drive safely (i.e. turning, bending, twisting, etc.) without distraction from pain. Do not operate heavy machinery under the influence of opioid medications. Urination can be difficult after surgery. Regular bowel function is important to maintain even if it causes discomfort. If no bowel  movement occurs within 48 hours please call our office. Tenesmus (feeling as though you must have a bowel movement without stool present) is very common for the first few weeks after perirectal surgery.     Bathing: You should plan on immersing your bottom in water for comfort several times a day during the initial recovery either in a bathtub, shower, or in a portable sitz bath (looks like a plastic hat upside down). Plain, warm water without soaps or salts is recommended. You may shower or bathe but avoid swimming for at least a week.     Pain and discomfort is expected after any surgery, but most patients do well with Tylenol (acetaminophen) 650 mg every 6 hours in addition to Motrin or Advil (ibuprofen) 600 mg every 6 hours. These can be staggered so you're alternating the medications every 3 hours. Do not take ibuprofen if you have kidney disease or have previously been told to avoid NSAIDs. You may also have a prescription for a stronger opioid-based pain medication at discharge, which is only for breakthrough pain. You should take Colace 100 mg capsules (available at any local drug store) twice a day while taking any opioid pain medication. Constipation can be alleviated with over-the-counter laxatives (such as Milk of Magnesia) until the problem has resolved. If you take any anticoagulation or antiplatelet medication, this medication can be resumed 48 hours after surgery unless you were informed otherwise.

## 2024-04-25 NOTE — ANESTHESIA POSTPROCEDURE EVALUATION
Patient: Gregor Finney    Procedure Summary       Date: 04/25/24 Room / [facility]: [facility]    Anesthesia Start: 0750 Anesthesia Stop:     Procedure: Excision Hemorrhoid, Colonoscopy Diagnosis:       External hemorrhoid      Colon cancer screening      (External hemorrhoid [K64.4])      (Colon cancer screening [Z12.11])    Surgeons: Kermit Soriano MD Responsible Provider: Elmo Aguayo DO    Anesthesia Type: general ASA Status: 3            Anesthesia Type: general    Vitals Value Taken Time   /76 04/25/24 0926   Temp 36.3 °C (97.3 °F) 04/25/24 0926   Pulse 104 04/25/24 0926   Resp 16 04/25/24 0926   SpO2 100 % 04/25/24 0934   Vitals shown include unfiled device data.    Anesthesia Post Evaluation    Patient location during evaluation: bedside  Patient participation: complete - patient participated  Level of consciousness: awake  Pain score: 2  Pain management: adequate  Airway patency: patent  Two or more strategies used to mitigate risk of obstructive sleep apnea  Cardiovascular status: acceptable  Respiratory status: acceptable  Hydration status: acceptable  Postoperative Nausea and Vomiting: none        There were no known notable events for this encounter.

## 2024-04-26 RX ORDER — PROMETHAZINE HYDROCHLORIDE 6.25 MG/5ML
SYRUP ORAL
Qty: 360 ML | Refills: 1 | Status: SHIPPED | OUTPATIENT
Start: 2024-04-26

## 2024-04-28 DIAGNOSIS — R33.9 URINARY RETENTION: Primary | ICD-10-CM

## 2024-04-28 RX ORDER — TAMSULOSIN HYDROCHLORIDE 0.4 MG/1
0.4 CAPSULE ORAL DAILY
Qty: 5 CAPSULE | Refills: 0 | Status: SHIPPED | OUTPATIENT
Start: 2024-04-28 | End: 2024-05-03

## 2024-04-28 RX ORDER — TAMSULOSIN HYDROCHLORIDE 0.4 MG/1
0.4 CAPSULE ORAL DAILY
Status: CANCELLED | OUTPATIENT
Start: 2024-04-28

## 2024-04-29 LAB
LABORATORY COMMENT REPORT: NORMAL
LABORATORY COMMENT REPORT: NORMAL
PATH REPORT.FINAL DX SPEC: NORMAL
PATH REPORT.FINAL DX SPEC: NORMAL
PATH REPORT.GROSS SPEC: NORMAL
PATH REPORT.GROSS SPEC: NORMAL
PATH REPORT.RELEVANT HX SPEC: NORMAL
PATH REPORT.RELEVANT HX SPEC: NORMAL
PATH REPORT.TOTAL CANCER: NORMAL
PATH REPORT.TOTAL CANCER: NORMAL

## 2024-04-30 ENCOUNTER — TELEPHONE (OUTPATIENT)
Dept: SURGERY | Facility: CLINIC | Age: 64
End: 2024-04-30
Payer: COMMERCIAL

## 2024-04-30 DIAGNOSIS — N99.89 POSTOPERATIVE URINARY RETENTION: ICD-10-CM

## 2024-04-30 DIAGNOSIS — R33.8 POSTOPERATIVE URINARY RETENTION: ICD-10-CM

## 2024-04-30 NOTE — TELEPHONE ENCOUNTER
He is s/p 4/25/2024 Excisional hemorrhoidectomy and Colonoscopy with polypectomy.  He is calling today that he is having pain from the surgery.  He stopped using Oxycodone.  Has been taking Ibuprofen.  Had two bowel motions during the night and two today. Pain increases after the bowel motions.  He is having blood dripping into the toilet with bowel motions.  He is not taking the muscle relaxer.  He is having intermittent trouble urinating.  He will get sensation to urinate and then cannot.  Other times he can pass urine.  I discussed that the hemorrhoid surgery causes a lot of pain that is made worse with a bowel motion.  It is a good idea to use the Oxycodone, Tylenol, and Ibuprofen to help with pain control.  You can also take the muscle relaxer to help with the pain.  Please do not take the Oxycodone and the Tizanidine at the same time.  Please alternate.  Continue with warm tub soaks/sitz baths.  Can sit on ice pack  Continue with Miralax while using Oxycodone. Need to avoid constipation.  If you cannot urine will need to be seen in ER.  If you have heavy bleeding and passing large blood clots will need to be seen in ER.  Otherwise I expect you to have pain for about two weeks.  I expect you to have a small amount of bleeding with bowel motions for 4-6 weeks.  Galina Tam RN

## 2024-05-02 RX ORDER — TAMSULOSIN HYDROCHLORIDE 0.4 MG/1
0.4 CAPSULE ORAL DAILY
Qty: 14 CAPSULE | Refills: 0 | Status: SHIPPED | OUTPATIENT
Start: 2024-05-02

## 2024-05-03 ENCOUNTER — APPOINTMENT (OUTPATIENT)
Dept: SURGERY | Facility: CLINIC | Age: 64
End: 2024-05-03
Payer: COMMERCIAL

## 2024-05-08 DIAGNOSIS — J98.01 BRONCHOSPASM: ICD-10-CM

## 2024-05-08 DIAGNOSIS — J44.9 CHRONIC OBSTRUCTIVE PULMONARY DISEASE, UNSPECIFIED COPD TYPE (MULTI): ICD-10-CM

## 2024-05-08 DIAGNOSIS — R05.3 CHRONIC COUGH: ICD-10-CM

## 2024-05-09 DIAGNOSIS — J98.01 BRONCHOSPASM: ICD-10-CM

## 2024-05-09 RX ORDER — ALBUTEROL SULFATE 0.83 MG/ML
2.5 SOLUTION RESPIRATORY (INHALATION) EVERY 6 HOURS PRN
Qty: 90 ML | Refills: 1 | OUTPATIENT
Start: 2024-05-09

## 2024-05-09 NOTE — TELEPHONE ENCOUNTER
Request for albuterol 2.5 mg /3ml 0.083 nebulizer solution please send to [facility] on [address] in [facility] 4/24/24

## 2024-05-14 ENCOUNTER — TELEPHONE (OUTPATIENT)
Dept: PRIMARY CARE | Facility: CLINIC | Age: 64
End: 2024-05-14
Payer: COMMERCIAL

## 2024-05-14 RX ORDER — ALBUTEROL SULFATE 0.83 MG/ML
2.5 SOLUTION RESPIRATORY (INHALATION) EVERY 6 HOURS PRN
Qty: 90 ML | Refills: 1 | Status: SHIPPED | OUTPATIENT
Start: 2024-05-14

## 2024-05-14 RX ORDER — FLUTICASONE FUROATE AND VILANTEROL TRIFENATATE 100; 25 UG/1; UG/1
1 POWDER RESPIRATORY (INHALATION) DAILY
Qty: 180 EACH | Refills: 1 | Status: SHIPPED | OUTPATIENT
Start: 2024-05-14

## 2024-05-14 NOTE — TELEPHONE ENCOUNTER
Per nurse, called pharm to provide dx: J44.9 for COPD but pharmacist cannot accept verbal.  Is there a way to preset the code into this med so this does not happen when time to send next Rx?      Pharm states pt already paid out of pocket to get med for this month.

## 2024-05-14 NOTE — TELEPHONE ENCOUNTER
Pharm states Medicare requires dx: code for albuterol nebulizer solution.  Please provide code.      [facility] [phone]

## 2024-05-20 NOTE — PROGRESS NOTES
[facility]: GENERAL SURGERY  PROGRESS NOTE      Gregor Finney is a 64 y.o. male that is presenting today for No chief complaint on file..    ASSESSMENT / PLAN:  There are no diagnoses linked to this encounter.  Healing as expected  Repeat colonoscopy 5 years  Maintain high-fiber diet  Patient Active Problem List   Diagnosis    Chronic rhinitis    Cervical radiculitis    Displacement of lumbar intervertebral disc    Basophilic leukocytosis    Eosinophilic leukocytosis    Epistaxis    Erectile dysfunction    Gastroesophageal reflux disease    Hepatic steatosis    Hypothyroidism    Neutrophilic leukocytosis    Chronic obstructive pulmonary disease (Multi)    Chronic emphysema syndrome (Multi)    Pure hypercholesterolemia    Reactive airway disease without complication (HHS-HCC)    Smoker    Radiculopathy, lumbar region    Spinal stenosis of lumbar region with neurogenic claudication    Transaminasemia    Vitamin D deficiency    Thrombosed external hemorrhoid    External hemorrhoid    Colon cancer screening    Contact with and (suspected) exposure to viral hepatitis    Hyperglycemia, unspecified    Tobacco use    Elevation of levels of liver transaminase levels    Erectile dysfunction associated with vasculopathy    Herniated lumbar intervertebral disc    History of hypercholesterolemia    History of substance dependence (Multi)    Recurrent herpes simplex    Allergic rhinitis    Pulmonary emphysema (Multi)    Eosinophil count raised    Steatosis of liver    Lumbar radiculopathy    Reactive airway disease (HHS-HCC)    Current smoker    Thrombosed external hemorrhoids     Subjective   Gregor Finney 67 yr old Male here for a follow up for a Colonoscopy and excisional Hemorrhoid on 05/03/2024.     Procedures  Excision Hemorrhoid, Colonoscopy  84124 - [place] HEMORRHOIDECTOMY NTRNL & XTRNL 1 COLUMN/GROUP  1.  Excisional hemorrhoidectomy, internal/external 1 column  2.  Internal hemorrhoidectomy x  2    Pathology: 4/25/2024  FINAL DIAGNOSIS   A. POSTERIOR INTERNAL/EXTERNAL HEMORRHOID, EXCISION:   -- Squamolumnar mucosa with dilated and congested submucosal veins consistent with hemorrhoids.     B. LEFT LATERAL INTERNAL HEMORRHOID, EXCISION:   --Squamolumnar mucosa with dilated and congested submucosal veins consistent with hemorrhoids.     C. ANTERIOR MIDLINE INTERNAL HEMORRHOID, EXCISION:   --Squamolumnar mucosa with dilated and congested submucosal veins consistent with hemorrhoids.     D. COLON, PROXIMAL TRANSVERSE POLYP, BIOPSY:   -- Tubular adenoma.     E. COLON, SIGMOID POLYP, BIOPSY:   --Tubular adenoma.     No issues.  Minor spotting when he was mowing his lawn but seems of resolved.    Review of Systems   Constitutional: Negative.    HENT: Negative.     Eyes: Negative.    Respiratory: Negative.     Cardiovascular: Negative.    Gastrointestinal: Negative.    Genitourinary: Negative.    Skin: Negative.    All other systems reviewed and are negative.     Objective   There were no vitals filed for this visit.   Physical Exam  Constitutional:       Appearance: Normal appearance.   HENT:      Head: Normocephalic.   Eyes:      Pupils: Pupils are equal, round, and reactive to light.   Cardiovascular:      Rate and Rhythm: Normal rate.   Pulmonary:      Effort: Pulmonary effort is normal.   Abdominal:      General: Abdomen is flat. Bowel sounds are normal.      Palpations: Abdomen is soft.   Genitourinary:     Comments: Incisions healing, minor discomfort digital rectal exam.  Mildly enlarged prostate.  Anoscopy deferred due to pain.  Skin:     General: Skin is warm.   Neurological:      General: No focal deficit present.      Mental Status: He is alert.         Diagnostic Results   Lab Results   Component Value Date    GLUCOSE 108 (H) 07/11/2023    CALCIUM 9.6 07/11/2023     07/11/2023    K 4.6 07/11/2023    CO2 23 (L) 07/11/2023     07/11/2023    BUN 21 07/11/2023    CREATININE 1.2 07/11/2023  "    Lab Results   Component Value Date    ALT 24 10/16/2023    AST 17 10/16/2023    ALKPHOS 109 10/16/2023    BILITOT 0.4 10/16/2023     Lab Results   Component Value Date    WBC 17.9 (H) 03/21/2024    HGB 15.2 03/21/2024    HCT 46.6 03/21/2024    MCV 97 03/21/2024     03/21/2024     Lab Results   Component Value Date    CHOL 150 07/11/2023     Lab Results   Component Value Date    HDL 33 (L) 07/11/2023     Lab Results   Component Value Date    LDLCALC 85 07/11/2023     Lab Results   Component Value Date    TRIG 160 (H) 07/11/2023     No components found for: \"CHOLHDL\"  Lab Results   Component Value Date    HGBA1C 5.8 07/11/2023     Other labs not included in the list above were reviewed either before or during this encounter.    History    Past Medical History:   Diagnosis Date    Adverse effect of anesthesia     Chronic cough     COPD (chronic obstructive pulmonary disease) (Multi)     Fatty liver     GERD (gastroesophageal reflux disease)     H/O leukocytosis     H/O thrombocytosis     Hyperlipidemia     Personal history of other endocrine, nutritional and metabolic disease     History of hypercholesterolemia     Past Surgical History:   Procedure Laterality Date    BACK SURGERY      BLEPHAROPLASTY Bilateral     bilateral upper blepharoplasty    OTHER SURGICAL HISTORY  09/16/2019    Nasal septoplasty    OTHER SURGICAL HISTORY Left 09/16/2019    Knee surgery    OTHER SURGICAL HISTORY Left 09/16/2019    Elbow surgery     Family History   Problem Relation Name Age of Onset    COPD Mother      Emphysema Mother      Stroke Other      Diabetes Other      Cancer Other      Heart attack Other       Allergies   Allergen Reactions    Bupropion Agitation    Levofloxacin Other     foot, knee pain    Sulfa (Sulfonamide Antibiotics) Rash and Unknown    Tetracycline Rash     Current Outpatient Medications on File Prior to Visit   Medication Sig Dispense Refill    acetaminophen (Tylenol) 325 mg tablet Take 2 tablets (650 " mg) by mouth every 6 hours if needed for mild pain (1 - 3) for up to 30 doses. 30 tablet 0    albuterol 2.5 mg /3 mL (0.083 %) nebulizer solution Take 3 mL (2.5 mg) by nebulization every 6 hours if needed for wheezing or shortness of breath. 90 mL 1    albuterol 90 mcg/actuation inhaler Inhale 1-2 puffs every 6 hours if needed for wheezing. (Patient not taking: Reported on 4/25/2024) 18 g 1    ascorbic acid (Vitamin C) 250 mg tablet Take 1 tablet (250 mg) by mouth once daily. TAKE 1 TABLET DAILY.      atorvastatin (Lipitor) 20 mg tablet Take 1 tablet (20 mg) by mouth once daily.  1 tablet Orally Once a day for 90 day(s) 90 tablet 1    benzonatate (Tessalon) 200 mg capsule Take 1 capsule (200 mg) by mouth 2 times a day. Do not crush or chew. 180 capsule 1    Breo Ellipta 100-25 mcg/dose inhaler INHALE 1 PUFF DAILY 180 each 1    cholecalciferol (Vitamin D3) 25 MCG (1000 UT) tablet Take 1 capsule by mouth once daily.  1 capsule Orally Once a day for 30 day(s)      famciclovir (Famvir) 125 mg tablet TAKE 2 TABLETS (250 MG) BY MOUTH 2 TIMES A DAY. (Patient taking differently: Take 2 tablets (250 mg) by mouth once daily as needed.) 60 tablet 1    ibuprofen 600 mg tablet Take 1 tablet (600 mg) by mouth every 6 hours if needed for moderate pain (4 - 6) for up to 30 doses. 30 tablet 0    oxyCODONE (Roxicodone) 5 mg immediate release tablet Take 1 tablet (5 mg) by mouth every 6 hours if needed for severe pain (7 - 10) for up to 24 doses. 24 tablet 0    polyethylene glycol (Glycolax, Miralax) 17 gram/dose powder Mix 17 grams in liquid and drink by mouth once daily as needed (for constipation) for up to 10 doses. 238 g 0    promethazine (Phenergan) 6.25 mg/5 mL syrup TAKE 1 MILLILITER BY MOUTH EVERY 6 HOURS AS NEEDED 360 mL 1    sildenafil (Viagra) 100 mg tablet Take 1 tablet (100 mg) by mouth once daily as needed for erectile dysfunction. (Patient not taking: Reported on 4/25/2024) 10 tablet 5    tamsulosin (Flomax) 0.4 mg 24  hr capsule Take 1 capsule (0.4 mg) by mouth once daily. 14 capsule 0    tiZANidine (Zanaflex) 4 mg capsule Take 1 capsule (4 mg) by mouth 3 times a day as needed for muscle spasms for up to 20 doses. 20 capsule 0    tiZANidine (Zanaflex) 4 mg capsule Take 1 capsule (4 mg) by mouth 3 times a day for 10 days. 30 capsule 0    ZINC GLUCONATE ORAL Take 1 tablet by mouth once daily.  TAKE 1 TABLET DAILY.       No current facility-administered medications on file prior to visit.     Immunization History   Administered Date(s) Administered    DTaP vaccine, pediatric  (INFANRIX) 03/28/2012    Flu vaccine (IIV4), preservative free *Check age/dose* 10/12/2020, 10/06/2021, 09/20/2022    Flu vaccine, quadrivalent, high-dose, preservative free, age 65y+ (FLUZONE) 10/16/2023    Hep A / Hep B 09/03/2021    Hepatitis A vaccine, age 19 years and greater (HAVRIX) 09/03/2021    Hepatitis B vaccine, adult (RECOMBIVAX, ENGERIX) 09/03/2021, 11/10/2021    Moderna COVID-19 vaccine, bivalent, blue cap/gray label *Check age/dose* 11/18/2022    Pneumococcal conjugate vaccine, 13-valent (PREVNAR 13) 11/19/2019    Pneumococcal conjugate vaccine, 20-valent (PREVNAR 20) 01/10/2024    Pneumococcal polysaccharide vaccine, 23-valent, age 2 years and older (PNEUMOVAX 23) 10/02/2018    Td vaccine, age 7 years and older (TDVAX) 01/01/2000    Zoster vaccine, recombinant, adult (SHINGRIX) 11/19/2019     Patient's medical history was reviewed and updated either before or during this encounter.    Kermit Soriano MD

## 2024-05-23 ENCOUNTER — OFFICE VISIT (OUTPATIENT)
Dept: SURGERY | Facility: CLINIC | Age: 64
End: 2024-05-23
Payer: MEDICARE

## 2024-05-23 VITALS
BODY MASS INDEX: 30.09 KG/M2 | OXYGEN SATURATION: 96 % | SYSTOLIC BLOOD PRESSURE: 104 MMHG | HEART RATE: 103 BPM | HEIGHT: 70 IN | WEIGHT: 210.2 LBS | TEMPERATURE: 98.6 F | DIASTOLIC BLOOD PRESSURE: 75 MMHG

## 2024-05-23 DIAGNOSIS — K64.4 EXTERNAL HEMORRHOID: Primary | ICD-10-CM

## 2024-05-23 PROCEDURE — 99024 POSTOP FOLLOW-UP VISIT: CPT | Performed by: SURGERY

## 2024-05-23 PROCEDURE — 1126F AMNT PAIN NOTED NONE PRSNT: CPT | Performed by: SURGERY

## 2024-05-23 PROCEDURE — 1159F MED LIST DOCD IN RCRD: CPT | Performed by: SURGERY

## 2024-05-23 ASSESSMENT — PATIENT HEALTH QUESTIONNAIRE - PHQ9
1. LITTLE INTEREST OR PLEASURE IN DOING THINGS: NOT AT ALL
SUM OF ALL RESPONSES TO PHQ9 QUESTIONS 1 & 2: 0
2. FEELING DOWN, DEPRESSED OR HOPELESS: NOT AT ALL

## 2024-05-23 ASSESSMENT — COLUMBIA-SUICIDE SEVERITY RATING SCALE - C-SSRS
6. HAVE YOU EVER DONE ANYTHING, STARTED TO DO ANYTHING, OR PREPARED TO DO ANYTHING TO END YOUR LIFE?: NO
2. HAVE YOU ACTUALLY HAD ANY THOUGHTS OF KILLING YOURSELF?: NO
1. IN THE PAST MONTH, HAVE YOU WISHED YOU WERE DEAD OR WISHED YOU COULD GO TO SLEEP AND NOT WAKE UP?: NO

## 2024-05-23 ASSESSMENT — PAIN SCALES - GENERAL: PAINLEVEL: 0-NO PAIN

## 2024-05-23 ASSESSMENT — ENCOUNTER SYMPTOMS
EYES NEGATIVE: 1
LOSS OF SENSATION IN FEET: 0
DEPRESSION: 0
CONSTITUTIONAL NEGATIVE: 1
OCCASIONAL FEELINGS OF UNSTEADINESS: 0
RESPIRATORY NEGATIVE: 1
GASTROINTESTINAL NEGATIVE: 1
CARDIOVASCULAR NEGATIVE: 1

## 2024-05-24 ENCOUNTER — APPOINTMENT (OUTPATIENT)
Dept: SURGERY | Facility: CLINIC | Age: 64
End: 2024-05-24
Payer: COMMERCIAL

## 2024-07-22 DIAGNOSIS — R05.3 CHRONIC COUGH: ICD-10-CM

## 2024-07-22 DIAGNOSIS — N52.9 VASCULOGENIC ERECTILE DYSFUNCTION, UNSPECIFIED VASCULOGENIC ERECTILE DYSFUNCTION TYPE: ICD-10-CM

## 2024-07-22 DIAGNOSIS — J43.9 CHRONIC EMPHYSEMA SYNDROME (MULTI): ICD-10-CM

## 2024-07-22 DIAGNOSIS — J44.9 CHRONIC OBSTRUCTIVE PULMONARY DISEASE, UNSPECIFIED COPD TYPE (MULTI): ICD-10-CM

## 2024-07-22 DIAGNOSIS — B00.9 RECURRENT HERPES SIMPLEX: ICD-10-CM

## 2024-07-22 NOTE — TELEPHONE ENCOUNTER
"Refill requests 2 different pharmacies    [facility] [address]   Sildenafil 100 mg  Benzonatate 200 mg       [facility] Ellipta 100-25 mcg  Promethazine 6.25 mg/5 ml syrup  Famciclovir 125 mg     LV 4/4/24 [place] none - refused to make appt. States \"I've seen her a lot this year already, it costs me $250, I won't pay that for an appt for medication refills.  I don't need to see her til next year\"          "

## 2024-07-24 DIAGNOSIS — R53.83 FATIGUE, UNSPECIFIED TYPE: ICD-10-CM

## 2024-07-24 DIAGNOSIS — E78.1 PURE HYPERTRIGLYCERIDEMIA: Primary | ICD-10-CM

## 2024-07-24 DIAGNOSIS — E78.6 LOW HDL (UNDER 40): ICD-10-CM

## 2024-07-24 DIAGNOSIS — R73.9 HYPERGLYCEMIA: ICD-10-CM

## 2024-07-24 DIAGNOSIS — Z12.5 ENCOUNTER FOR PROSTATE CANCER SCREENING: ICD-10-CM

## 2024-07-24 DIAGNOSIS — E55.9 VITAMIN D DEFICIENCY: ICD-10-CM

## 2024-07-24 RX ORDER — FLUTICASONE FUROATE AND VILANTEROL 100; 25 UG/1; UG/1
1 POWDER RESPIRATORY (INHALATION) DAILY
Qty: 3 EACH | Refills: 0 | Status: SHIPPED | OUTPATIENT
Start: 2024-07-24

## 2024-07-24 RX ORDER — FAMCICLOVIR 125 MG/1
250 TABLET ORAL 2 TIMES DAILY
Qty: 120 TABLET | Refills: 1 | Status: SHIPPED | OUTPATIENT
Start: 2024-07-24

## 2024-07-24 RX ORDER — PROMETHAZINE HYDROCHLORIDE 6.25 MG/5ML
6.25 SYRUP ORAL DAILY PRN
Qty: 360 ML | Refills: 0 | Status: SHIPPED | OUTPATIENT
Start: 2024-07-24

## 2024-08-01 DIAGNOSIS — N52.9 VASCULOGENIC ERECTILE DYSFUNCTION, UNSPECIFIED VASCULOGENIC ERECTILE DYSFUNCTION TYPE: ICD-10-CM

## 2024-08-05 RX ORDER — SILDENAFIL 100 MG/1
100 TABLET, FILM COATED ORAL DAILY PRN
Qty: 10 TABLET | Refills: 0 | Status: SHIPPED | OUTPATIENT
Start: 2024-08-05 | End: 2025-08-05

## 2024-09-04 ENCOUNTER — OFFICE VISIT (OUTPATIENT)
Dept: PRIMARY CARE | Facility: CLINIC | Age: 64
End: 2024-09-04
Payer: MEDICARE

## 2024-09-04 VITALS
DIASTOLIC BLOOD PRESSURE: 66 MMHG | SYSTOLIC BLOOD PRESSURE: 110 MMHG | OXYGEN SATURATION: 96 % | WEIGHT: 198.6 LBS | BODY MASS INDEX: 28.43 KG/M2 | HEIGHT: 70 IN | HEART RATE: 103 BPM

## 2024-09-04 DIAGNOSIS — J44.9 CHRONIC OBSTRUCTIVE PULMONARY DISEASE, UNSPECIFIED COPD TYPE (MULTI): ICD-10-CM

## 2024-09-04 DIAGNOSIS — R22.2 NODULE OF ANTERIOR CHEST WALL: ICD-10-CM

## 2024-09-04 DIAGNOSIS — Z76.89 ESTABLISHING CARE WITH NEW DOCTOR, ENCOUNTER FOR: ICD-10-CM

## 2024-09-04 DIAGNOSIS — Z23 NEED FOR INFLUENZA VACCINATION: Primary | ICD-10-CM

## 2024-09-04 DIAGNOSIS — Z20.5 EXPOSURE TO HEPATITIS C: ICD-10-CM

## 2024-09-04 DIAGNOSIS — R05.1 ACUTE COUGH: ICD-10-CM

## 2024-09-04 DIAGNOSIS — B00.9 RECURRENT HERPES SIMPLEX: ICD-10-CM

## 2024-09-04 DIAGNOSIS — J43.9 CHRONIC EMPHYSEMA SYNDROME (MULTI): ICD-10-CM

## 2024-09-04 DIAGNOSIS — R05.3 CHRONIC COUGH: ICD-10-CM

## 2024-09-04 DIAGNOSIS — N52.9 VASCULOGENIC ERECTILE DYSFUNCTION, UNSPECIFIED VASCULOGENIC ERECTILE DYSFUNCTION TYPE: ICD-10-CM

## 2024-09-04 DIAGNOSIS — E78.00 PURE HYPERCHOLESTEROLEMIA: ICD-10-CM

## 2024-09-04 DIAGNOSIS — Z85.828 HISTORY OF SKIN CANCER: ICD-10-CM

## 2024-09-04 PROCEDURE — 99214 OFFICE O/P EST MOD 30 MIN: CPT | Performed by: STUDENT IN AN ORGANIZED HEALTH CARE EDUCATION/TRAINING PROGRAM

## 2024-09-04 PROCEDURE — 1159F MED LIST DOCD IN RCRD: CPT | Performed by: STUDENT IN AN ORGANIZED HEALTH CARE EDUCATION/TRAINING PROGRAM

## 2024-09-04 PROCEDURE — 3008F BODY MASS INDEX DOCD: CPT | Performed by: STUDENT IN AN ORGANIZED HEALTH CARE EDUCATION/TRAINING PROGRAM

## 2024-09-04 PROCEDURE — 90662 IIV NO PRSV INCREASED AG IM: CPT | Performed by: STUDENT IN AN ORGANIZED HEALTH CARE EDUCATION/TRAINING PROGRAM

## 2024-09-04 RX ORDER — BENZONATATE 200 MG/1
200 CAPSULE ORAL 2 TIMES DAILY
Qty: 180 CAPSULE | Refills: 1 | Status: SHIPPED | OUTPATIENT
Start: 2024-09-04

## 2024-09-04 RX ORDER — ATORVASTATIN CALCIUM 20 MG/1
20 TABLET, FILM COATED ORAL DAILY
Qty: 90 TABLET | Refills: 1 | Status: SHIPPED | OUTPATIENT
Start: 2024-09-04 | End: 2024-09-04 | Stop reason: SDUPTHER

## 2024-09-04 RX ORDER — ATORVASTATIN CALCIUM 20 MG/1
20 TABLET, FILM COATED ORAL DAILY
Qty: 90 TABLET | Refills: 1 | Status: SHIPPED | OUTPATIENT
Start: 2024-09-04

## 2024-09-04 RX ORDER — FLUTICASONE FUROATE AND VILANTEROL 100; 25 UG/1; UG/1
1 POWDER RESPIRATORY (INHALATION) DAILY
Qty: 3 EACH | Refills: 3 | Status: SHIPPED | OUTPATIENT
Start: 2024-09-04

## 2024-09-04 RX ORDER — SILDENAFIL 100 MG/1
100 TABLET, FILM COATED ORAL DAILY PRN
Qty: 10 TABLET | Refills: 3 | Status: SHIPPED | OUTPATIENT
Start: 2024-09-04 | End: 2025-09-04

## 2024-09-04 RX ORDER — ACYCLOVIR 50 MG/G
1 OINTMENT TOPICAL AS NEEDED
COMMUNITY
Start: 2024-08-21

## 2024-09-04 RX ORDER — PROMETHAZINE HYDROCHLORIDE 6.25 MG/5ML
6.25 SYRUP ORAL DAILY PRN
Qty: 360 ML | Refills: 2 | Status: SHIPPED | OUTPATIENT
Start: 2024-09-04

## 2024-09-04 ASSESSMENT — PATIENT HEALTH QUESTIONNAIRE - PHQ9
SUM OF ALL RESPONSES TO PHQ9 QUESTIONS 1 AND 2: 1
1. LITTLE INTEREST OR PLEASURE IN DOING THINGS: NOT AT ALL
10. IF YOU CHECKED OFF ANY PROBLEMS, HOW DIFFICULT HAVE THESE PROBLEMS MADE IT FOR YOU TO DO YOUR WORK, TAKE CARE OF THINGS AT HOME, OR GET ALONG WITH OTHER PEOPLE: SOMEWHAT DIFFICULT
2. FEELING DOWN, DEPRESSED OR HOPELESS: SEVERAL DAYS

## 2024-09-04 NOTE — PROGRESS NOTES
Subjective   Patient ID: Gregor Finney is a 64 y.o. male who presents for Establish Care.    HPI  63 yo male here to establish care  Est care  Hep C exposure  Was tested afterward and was supposed to get blood work repeated  3. Need for flu vaccine  4. ED  Takes sildenafil prn  5. Cough  Takes promethazine nightly  Takes tessalon perles 2-3 times per day  6. COPD  On Breo Ellipta  Would like cheaper alternative  Symbicort cost him more  Has albuterol to use as needed, doesn't use it often  7. Cold sores  Takes famciclovir prn, acyclovir ointment  8. HLD  On atorvastatin 20 mg daily  9. Hx of skin cancer   Sees dermatology annually  10. Chest nodule  US didn't show anything from a few years ago  Low dose chest CT hasn't picked it up  Palpable nodule on anterior chest wall  Sometimes painful  Has noticed it getting bigger    Review of Systems  All pertinent positive symptoms are included in the history of present illness.    All other systems have been reviewed and are negative and noncontributory to this patient's current ailments.     Allergies   Allergen Reactions    Bupropion Agitation    Levofloxacin Other     foot, knee pain    Sulfa (Sulfonamide Antibiotics) Rash and Unknown    Tetracycline Rash        Immunization History   Administered Date(s) Administered    DTaP vaccine, pediatric  (INFANRIX) 03/28/2012    Flu vaccine (IIV4), preservative free *Check age/dose* 10/12/2020, 10/06/2021, 09/20/2022    Flu vaccine, quadrivalent, high-dose, preservative free, age 65y+ (FLUZONE) 10/16/2023    Hep A / Hep B 09/03/2021    Hepatitis A vaccine, age 19 years and greater (HAVRIX) 09/03/2021    Hepatitis B vaccine, adult *Check Product/Dose* 09/03/2021, 11/10/2021    Moderna COVID-19 vaccine, bivalent, blue cap/gray label *Check age/dose* 11/18/2022    Pneumococcal conjugate vaccine, 13-valent (PREVNAR 13) 11/19/2019    Pneumococcal conjugate vaccine, 20-valent (PREVNAR 20) 01/10/2024    Pneumococcal polysaccharide  "vaccine, 23-valent, age 2 years and older (PNEUMOVAX 23) 10/02/2018    Td vaccine, age 7 years and older (TDVAX) 01/01/2000    Zoster vaccine, recombinant, adult (SHINGRIX) 11/19/2019       Objective   Vitals:    09/04/24 0954   BP: 110/66   Pulse: 103   SpO2: 96%   Weight: 90.1 kg (198 lb 9.6 oz)   Height: 1.778 m (5' 10\")       Physical Exam  CONSTITUTIONAL - well nourished, well developed, looks like stated age, in no acute distress  SKIN - normal skin color and pigmentation.  HEAD - no trauma, normocephalic  EYES - extraocular muscles are intact  ENT - atraumatic  NECK - no neck mass was observed  LUNGS - CTA B/L  CHEST - ~5cm hard nodule on anterior midline chest, nonmobile  CARDIAC - regular rate and regular rhythm  ABDOMEN - no organomegaly, soft, nontender, nondistended  EXTREMITIES - no edema, no deformities  MSK - moves limbs equally  NEUROLOGICAL - alert, oriented and no focal signs  PSYCHIATRIC - alert, pleasant and cordial, age-appropriate  IMMUNOLOGIC - no cervical lymphadenopathy     Assessment/Plan   65 yo male here to establish care  1. Est care  2. Hep C exposure  Was tested afterward and was supposed to get blood work repeated  3. Need for flu vaccine  Given today  4. ED  Continue sildenafil prn  5. Cough  Continue promethazine nightly, tessalon perles 2-3 times per day  6. COPD  Continue Breo Ellipta  Continue albuterol to use as needed  7. Cold sores  Continue famciclovir prn, acyclovir ointment  8. HLD  Continue atorvastatin 20 mg daily  9. Hx of skin cancer   Continue seeing dermatology annually  10. Chest nodule  Ultrasound    RTC in 6 months  "

## 2024-09-06 ENCOUNTER — LAB (OUTPATIENT)
Dept: LAB | Facility: LAB | Age: 64
End: 2024-09-06
Payer: COMMERCIAL

## 2024-09-06 ENCOUNTER — TELEPHONE (OUTPATIENT)
Dept: PRIMARY CARE | Facility: CLINIC | Age: 64
End: 2024-09-06

## 2024-09-06 DIAGNOSIS — E55.9 VITAMIN D DEFICIENCY: ICD-10-CM

## 2024-09-06 DIAGNOSIS — E78.1 PURE HYPERTRIGLYCERIDEMIA: ICD-10-CM

## 2024-09-06 DIAGNOSIS — Z20.5 EXPOSURE TO HEPATITIS C: Primary | ICD-10-CM

## 2024-09-06 DIAGNOSIS — Z12.5 ENCOUNTER FOR PROSTATE CANCER SCREENING: ICD-10-CM

## 2024-09-06 DIAGNOSIS — R73.9 HYPERGLYCEMIA: ICD-10-CM

## 2024-09-06 DIAGNOSIS — R53.83 FATIGUE, UNSPECIFIED TYPE: ICD-10-CM

## 2024-09-06 LAB
25(OH)D3 SERPL-MCNC: 37 NG/ML (ref 30–100)
ALBUMIN SERPL BCP-MCNC: 4.4 G/DL (ref 3.4–5)
ALP SERPL-CCNC: 97 U/L (ref 33–136)
ALT SERPL W P-5'-P-CCNC: 26 U/L (ref 10–52)
ANION GAP SERPL CALC-SCNC: 14 MMOL/L (ref 10–20)
AST SERPL W P-5'-P-CCNC: 17 U/L (ref 9–39)
BASOPHILS # BLD AUTO: 0.14 X10*3/UL (ref 0–0.1)
BASOPHILS NFR BLD AUTO: 1 %
BILIRUB SERPL-MCNC: 0.5 MG/DL (ref 0–1.2)
BUN SERPL-MCNC: 18 MG/DL (ref 6–23)
CALCIUM SERPL-MCNC: 9.7 MG/DL (ref 8.6–10.6)
CHLORIDE SERPL-SCNC: 102 MMOL/L (ref 98–107)
CHOLEST SERPL-MCNC: 150 MG/DL (ref 0–199)
CHOLESTEROL/HDL RATIO: 4.9
CO2 SERPL-SCNC: 26 MMOL/L (ref 21–32)
CREAT SERPL-MCNC: 1.2 MG/DL (ref 0.5–1.3)
EGFRCR SERPLBLD CKD-EPI 2021: 67 ML/MIN/1.73M*2
EOSINOPHIL # BLD AUTO: 0.99 X10*3/UL (ref 0–0.7)
EOSINOPHIL NFR BLD AUTO: 7 %
ERYTHROCYTE [DISTWIDTH] IN BLOOD BY AUTOMATED COUNT: 15.1 % (ref 11.5–14.5)
EST. AVERAGE GLUCOSE BLD GHB EST-MCNC: 120 MG/DL
GLUCOSE SERPL-MCNC: 120 MG/DL (ref 74–99)
HBA1C MFR BLD: 5.8 %
HCT VFR BLD AUTO: 47.3 % (ref 41–52)
HDLC SERPL-MCNC: 30.5 MG/DL
HGB BLD-MCNC: 15.5 G/DL (ref 13.5–17.5)
IMM GRANULOCYTES # BLD AUTO: 0.07 X10*3/UL (ref 0–0.7)
IMM GRANULOCYTES NFR BLD AUTO: 0.5 % (ref 0–0.9)
LDLC SERPL CALC-MCNC: 83 MG/DL
LYMPHOCYTES # BLD AUTO: 3.95 X10*3/UL (ref 1.2–4.8)
LYMPHOCYTES NFR BLD AUTO: 28 %
MCH RBC QN AUTO: 31.2 PG (ref 26–34)
MCHC RBC AUTO-ENTMCNC: 32.8 G/DL (ref 32–36)
MCV RBC AUTO: 95 FL (ref 80–100)
MONOCYTES # BLD AUTO: 1.52 X10*3/UL (ref 0.1–1)
MONOCYTES NFR BLD AUTO: 10.8 %
NEUTROPHILS # BLD AUTO: 7.42 X10*3/UL (ref 1.2–7.7)
NEUTROPHILS NFR BLD AUTO: 52.7 %
NON HDL CHOLESTEROL: 120 MG/DL (ref 0–149)
NRBC BLD-RTO: 0 /100 WBCS (ref 0–0)
PLATELET # BLD AUTO: 510 X10*3/UL (ref 150–450)
POTASSIUM SERPL-SCNC: 4.9 MMOL/L (ref 3.5–5.3)
PROT SERPL-MCNC: 7 G/DL (ref 6.4–8.2)
PSA SERPL-MCNC: 1.66 NG/ML
RBC # BLD AUTO: 4.97 X10*6/UL (ref 4.5–5.9)
SODIUM SERPL-SCNC: 137 MMOL/L (ref 136–145)
TRIGL SERPL-MCNC: 185 MG/DL (ref 0–149)
TSH SERPL-ACNC: 1.92 MIU/L (ref 0.44–3.98)
VLDL: 37 MG/DL (ref 0–40)
WBC # BLD AUTO: 14.1 X10*3/UL (ref 4.4–11.3)

## 2024-09-06 PROCEDURE — 80061 LIPID PANEL: CPT

## 2024-09-06 PROCEDURE — 84443 ASSAY THYROID STIM HORMONE: CPT

## 2024-09-06 PROCEDURE — 85025 COMPLETE CBC W/AUTO DIFF WBC: CPT

## 2024-09-06 PROCEDURE — 80076 HEPATIC FUNCTION PANEL: CPT

## 2024-09-06 PROCEDURE — 82306 VITAMIN D 25 HYDROXY: CPT

## 2024-09-06 PROCEDURE — 80053 COMPREHEN METABOLIC PANEL: CPT

## 2024-09-06 PROCEDURE — 84153 ASSAY OF PSA TOTAL: CPT

## 2024-09-06 PROCEDURE — 83036 HEMOGLOBIN GLYCOSYLATED A1C: CPT

## 2024-09-06 PROCEDURE — 86803 HEPATITIS C AB TEST: CPT

## 2024-09-06 NOTE — TELEPHONE ENCOUNTER
Pt wanted to let Dr. Linares know what pt did his bloodwork today. Pt will wait to see what Dr. Linares says about bloodwork.

## 2024-09-09 ENCOUNTER — TELEPHONE (OUTPATIENT)
Dept: PRIMARY CARE | Facility: CLINIC | Age: 64
End: 2024-09-09
Payer: COMMERCIAL

## 2024-09-09 LAB
ALBUMIN SERPL BCP-MCNC: 4.4 G/DL (ref 3.4–5)
ALP SERPL-CCNC: 107 U/L (ref 33–136)
ALT SERPL W P-5'-P-CCNC: 24 U/L (ref 10–52)
AST SERPL W P-5'-P-CCNC: 17 U/L (ref 9–39)
BILIRUB DIRECT SERPL-MCNC: 0.1 MG/DL (ref 0–0.3)
BILIRUB SERPL-MCNC: 0.4 MG/DL (ref 0–1.2)
HCV AB SER QL: NONREACTIVE
PROT SERPL-MCNC: 7 G/DL (ref 6.4–8.2)

## 2024-09-09 NOTE — TELEPHONE ENCOUNTER
Patient requested results for liver tests.  Please change orders for labs to add-ons.  The lab did not draw them when the patient went on 9/6/24.  It was for hepatic function, hep c RNA and hep c antibody.

## 2024-09-10 ENCOUNTER — TELEPHONE (OUTPATIENT)
Dept: PRIMARY CARE | Facility: CLINIC | Age: 64
End: 2024-09-10
Payer: COMMERCIAL

## 2024-09-10 LAB
HCV RNA SERPL NAA+PROBE-ACNC: NOT DETECTED K[IU]/ML
HCV RNA SERPL NAA+PROBE-LOG IU: NORMAL {LOG_IU}/ML

## 2024-09-12 ENCOUNTER — HOSPITAL ENCOUNTER (OUTPATIENT)
Dept: RADIOLOGY | Facility: HOSPITAL | Age: 64
Discharge: HOME | End: 2024-09-12
Payer: MEDICARE

## 2024-09-12 DIAGNOSIS — R22.2 NODULE OF ANTERIOR CHEST WALL: ICD-10-CM

## 2024-09-12 PROCEDURE — 76604 US EXAM CHEST: CPT

## 2024-09-16 ENCOUNTER — TELEPHONE (OUTPATIENT)
Dept: PRIMARY CARE | Facility: CLINIC | Age: 64
End: 2024-09-16
Payer: COMMERCIAL

## 2024-09-30 ENCOUNTER — TELEPHONE (OUTPATIENT)
Dept: PRIMARY CARE | Facility: CLINIC | Age: 64
End: 2024-09-30
Payer: COMMERCIAL

## 2024-09-30 NOTE — TELEPHONE ENCOUNTER
----- Message from Pardeep Max sent at 9/29/2024 11:07 PM EDT -----  Patient changed to you / we informed him that you'll discuss his abnormal BW with him. Thanx  ----- Message -----  From: Lab, Background User  Sent: 9/6/2024  10:47 PM EDT  To: Pardeep Max MD

## 2024-12-16 ENCOUNTER — OFFICE VISIT (OUTPATIENT)
Dept: PRIMARY CARE | Facility: CLINIC | Age: 64
End: 2024-12-16
Payer: MEDICARE

## 2024-12-16 VITALS
DIASTOLIC BLOOD PRESSURE: 76 MMHG | HEIGHT: 70 IN | WEIGHT: 193 LBS | BODY MASS INDEX: 27.63 KG/M2 | HEART RATE: 79 BPM | SYSTOLIC BLOOD PRESSURE: 128 MMHG | OXYGEN SATURATION: 97 %

## 2024-12-16 DIAGNOSIS — R10.32 LEFT LOWER QUADRANT PAIN: Primary | ICD-10-CM

## 2024-12-16 PROCEDURE — 3008F BODY MASS INDEX DOCD: CPT | Performed by: STUDENT IN AN ORGANIZED HEALTH CARE EDUCATION/TRAINING PROGRAM

## 2024-12-16 PROCEDURE — 99213 OFFICE O/P EST LOW 20 MIN: CPT | Performed by: STUDENT IN AN ORGANIZED HEALTH CARE EDUCATION/TRAINING PROGRAM

## 2024-12-16 PROCEDURE — 1125F AMNT PAIN NOTED PAIN PRSNT: CPT | Performed by: STUDENT IN AN ORGANIZED HEALTH CARE EDUCATION/TRAINING PROGRAM

## 2024-12-16 PROCEDURE — 1159F MED LIST DOCD IN RCRD: CPT | Performed by: STUDENT IN AN ORGANIZED HEALTH CARE EDUCATION/TRAINING PROGRAM

## 2024-12-16 PROCEDURE — 1158F ADVNC CARE PLAN TLK DOCD: CPT | Performed by: STUDENT IN AN ORGANIZED HEALTH CARE EDUCATION/TRAINING PROGRAM

## 2024-12-16 PROCEDURE — 1123F ACP DISCUSS/DSCN MKR DOCD: CPT | Performed by: STUDENT IN AN ORGANIZED HEALTH CARE EDUCATION/TRAINING PROGRAM

## 2024-12-16 ASSESSMENT — PAIN SCALES - GENERAL: PAINLEVEL_OUTOF10: 6

## 2024-12-16 NOTE — PROGRESS NOTES
"Subjective   Patient ID: Gregor Finney is a 64 y.o. male who presents for Abdominal Pain (LLQ).    HPI  65 yo male here for abdominal pain  L lower quadrant abdominal pain  Pain for 3 days  Has been getting more severe  No nausea, no vomiting  Normal bowel movements, no blood in stool  Constant pain  Pain is a 6  Worse when he coughs  Eating and drinking fine  Has been having increased gas lately    Review of Systems  All pertinent positive symptoms are included in the history of present illness.    All other systems have been reviewed and are negative and noncontributory to this patient's current ailments.     Allergies   Allergen Reactions    Bupropion Agitation    Levofloxacin Other     foot, knee pain    Sulfa (Sulfonamide Antibiotics) Rash and Unknown    Tetracycline Rash        Immunization History   Administered Date(s) Administered    DTaP vaccine, pediatric  (INFANRIX) 03/28/2012    Flu vaccine (IIV4), preservative free *Check age/dose* 10/12/2020, 10/06/2021, 09/20/2022    Flu vaccine, quadrivalent, high-dose, preservative free, age 65y+ (FLUZONE) 10/16/2023    Flu vaccine, trivalent, preservative free, HIGH-DOSE, age 65y+ (Fluzone) 09/04/2024    Hep A / Hep B 09/03/2021    Hepatitis A vaccine, age 19 years and greater (HAVRIX) 09/03/2021    Hepatitis B vaccine, adult *Check Product/Dose* 09/03/2021, 11/10/2021    Moderna COVID-19 vaccine, bivalent, blue cap/gray label *Check age/dose* 11/18/2022    Pneumococcal conjugate vaccine, 13-valent (PREVNAR 13) 11/19/2019    Pneumococcal conjugate vaccine, 20-valent (PREVNAR 20) 01/10/2024    Pneumococcal polysaccharide vaccine, 23-valent, age 2 years and older (PNEUMOVAX 23) 10/02/2018    Td vaccine, age 7 years and older (TDVAX) 01/01/2000    Zoster vaccine, recombinant, adult (SHINGRIX) 11/19/2019       Objective   Vitals:    12/16/24 1557   BP: 128/76   Pulse: 79   SpO2: 97%   Weight: 87.5 kg (193 lb)   Height: 1.778 m (5' 10\")       Physical " Exam  CONSTITUTIONAL - well nourished, well developed, looks like stated age, in no acute distress  SKIN - normal skin color and pigmentation. No rash, lesions, or nodules visualized  HEAD - no trauma, normocephalic  EYES - extraocular muscles are intact  ENT - atraumatic  NECK - no neck mass was observed  LUNGS - nonlabored breathing  ABDOMEN - L L Q PTP  EXTREMITIES - no edema, no deformities  MSK - moves limbs equally  NEUROLOGICAL - alert, oriented and no focal signs  PSYCHIATRIC - alert, pleasant and cordial, age-appropriate  IMMUNOLOGIC - no cervical lymphadenopathy     Assessment/Plan   65 yo male here for abdominal pain  L lower quadrant abdominal pain  Labs, Abdomnial CT ordered    RTC prn

## 2024-12-17 ENCOUNTER — TELEPHONE (OUTPATIENT)
Dept: PRIMARY CARE | Facility: CLINIC | Age: 64
End: 2024-12-17
Payer: COMMERCIAL

## 2024-12-17 ENCOUNTER — LAB (OUTPATIENT)
Dept: LAB | Facility: LAB | Age: 64
End: 2024-12-17
Payer: COMMERCIAL

## 2024-12-17 DIAGNOSIS — R10.32 LEFT LOWER QUADRANT PAIN: Primary | ICD-10-CM

## 2024-12-17 DIAGNOSIS — R10.32 LEFT LOWER QUADRANT PAIN: ICD-10-CM

## 2024-12-17 DIAGNOSIS — Z20.5 EXPOSURE TO HEPATITIS C: ICD-10-CM

## 2024-12-17 LAB
ALBUMIN SERPL BCP-MCNC: 4.5 G/DL (ref 3.4–5)
ALP SERPL-CCNC: 109 U/L (ref 33–136)
ALT SERPL W P-5'-P-CCNC: 26 U/L (ref 10–52)
ANION GAP SERPL CALCULATED.3IONS-SCNC: 11 MMOL/L (ref 10–20)
AST SERPL W P-5'-P-CCNC: 18 U/L (ref 9–39)
BASOPHILS # BLD AUTO: 0.13 X10*3/UL (ref 0–0.1)
BASOPHILS NFR BLD AUTO: 1 %
BILIRUB DIRECT SERPL-MCNC: 0.1 MG/DL (ref 0–0.3)
BILIRUB SERPL-MCNC: 0.5 MG/DL (ref 0–1.2)
BUN SERPL-MCNC: 16 MG/DL (ref 6–23)
CALCIUM SERPL-MCNC: 9.9 MG/DL (ref 8.6–10.3)
CHLORIDE SERPL-SCNC: 103 MMOL/L (ref 98–107)
CO2 SERPL-SCNC: 28 MMOL/L (ref 21–32)
CREAT SERPL-MCNC: 1.1 MG/DL (ref 0.5–1.3)
EGFRCR SERPLBLD CKD-EPI 2021: 74 ML/MIN/1.73M*2
EOSINOPHIL # BLD AUTO: 0.69 X10*3/UL (ref 0–0.7)
EOSINOPHIL NFR BLD AUTO: 5.5 %
ERYTHROCYTE [DISTWIDTH] IN BLOOD BY AUTOMATED COUNT: 15.2 % (ref 11.5–14.5)
GLUCOSE SERPL-MCNC: 100 MG/DL (ref 74–99)
HCT VFR BLD AUTO: 48.3 % (ref 41–52)
HCV AB SER QL: NONREACTIVE
HGB BLD-MCNC: 15.9 G/DL (ref 13.5–17.5)
IMM GRANULOCYTES # BLD AUTO: 0.03 X10*3/UL (ref 0–0.7)
IMM GRANULOCYTES NFR BLD AUTO: 0.2 % (ref 0–0.9)
LIPASE SERPL-CCNC: 36 U/L (ref 9–82)
LYMPHOCYTES # BLD AUTO: 3.56 X10*3/UL (ref 1.2–4.8)
LYMPHOCYTES NFR BLD AUTO: 28.3 %
MCH RBC QN AUTO: 31.6 PG (ref 26–34)
MCHC RBC AUTO-ENTMCNC: 32.9 G/DL (ref 32–36)
MCV RBC AUTO: 96 FL (ref 80–100)
MONOCYTES # BLD AUTO: 1.27 X10*3/UL (ref 0.1–1)
MONOCYTES NFR BLD AUTO: 10.1 %
NEUTROPHILS # BLD AUTO: 6.88 X10*3/UL (ref 1.2–7.7)
NEUTROPHILS NFR BLD AUTO: 54.9 %
NRBC BLD-RTO: 0 /100 WBCS (ref 0–0)
PLATELET # BLD AUTO: 459 X10*3/UL (ref 150–450)
POTASSIUM SERPL-SCNC: 5 MMOL/L (ref 3.5–5.3)
PROT SERPL-MCNC: 7.1 G/DL (ref 6.4–8.2)
RBC # BLD AUTO: 5.03 X10*6/UL (ref 4.5–5.9)
SODIUM SERPL-SCNC: 137 MMOL/L (ref 136–145)
WBC # BLD AUTO: 12.6 X10*3/UL (ref 4.4–11.3)

## 2024-12-17 PROCEDURE — 82248 BILIRUBIN DIRECT: CPT

## 2024-12-17 PROCEDURE — 85025 COMPLETE CBC W/AUTO DIFF WBC: CPT

## 2024-12-17 PROCEDURE — 86803 HEPATITIS C AB TEST: CPT

## 2024-12-17 PROCEDURE — 83690 ASSAY OF LIPASE: CPT

## 2024-12-17 PROCEDURE — 80053 COMPREHEN METABOLIC PANEL: CPT

## 2024-12-17 PROCEDURE — 87522 HEPATITIS C REVRS TRNSCRPJ: CPT

## 2024-12-18 ENCOUNTER — HOSPITAL ENCOUNTER (OUTPATIENT)
Dept: RADIOLOGY | Facility: CLINIC | Age: 64
Discharge: HOME | End: 2024-12-18
Payer: MEDICARE

## 2024-12-18 ENCOUNTER — TELEPHONE (OUTPATIENT)
Dept: PRIMARY CARE | Facility: CLINIC | Age: 64
End: 2024-12-18
Payer: COMMERCIAL

## 2024-12-18 DIAGNOSIS — K57.92 DIVERTICULITIS: Primary | ICD-10-CM

## 2024-12-18 DIAGNOSIS — R10.32 LEFT LOWER QUADRANT PAIN: ICD-10-CM

## 2024-12-18 PROCEDURE — 74177 CT ABD & PELVIS W/CONTRAST: CPT

## 2024-12-18 PROCEDURE — 74177 CT ABD & PELVIS W/CONTRAST: CPT | Performed by: RADIOLOGY

## 2024-12-18 PROCEDURE — 2550000001 HC RX 255 CONTRASTS: Performed by: STUDENT IN AN ORGANIZED HEALTH CARE EDUCATION/TRAINING PROGRAM

## 2024-12-19 RX ORDER — AMOXICILLIN AND CLAVULANATE POTASSIUM 875; 125 MG/1; MG/1
875 TABLET, FILM COATED ORAL 2 TIMES DAILY
Qty: 20 TABLET | Refills: 0 | Status: SHIPPED | OUTPATIENT
Start: 2024-12-19 | End: 2024-12-20 | Stop reason: SDUPTHER

## 2024-12-19 NOTE — TELEPHONE ENCOUNTER
Patient called and stated the prescription needs to be sent to [facility] on [address].  I have updated the pharmacy.  He also stated he thinks he had a reaction to Augmentin in the past and would like a different antibiotic sent in.    Patient also stated he had a colonoscopy in April and is asking why he needs another one.

## 2024-12-20 DIAGNOSIS — K57.92 DIVERTICULITIS: ICD-10-CM

## 2024-12-20 RX ORDER — AMOXICILLIN AND CLAVULANATE POTASSIUM 875; 125 MG/1; MG/1
875 TABLET, FILM COATED ORAL 2 TIMES DAILY
Qty: 20 TABLET | Refills: 0 | Status: SHIPPED | OUTPATIENT
Start: 2024-12-20 | End: 2024-12-30

## 2024-12-20 NOTE — TELEPHONE ENCOUNTER
Patient called and stated Augmentin makes him sick to his stomach and he would like something else sent in to the [facility] pharmacy.

## 2025-01-06 ENCOUNTER — OFFICE VISIT (OUTPATIENT)
Dept: PRIMARY CARE | Facility: CLINIC | Age: 65
End: 2025-01-06
Payer: MEDICARE

## 2025-01-06 VITALS
SYSTOLIC BLOOD PRESSURE: 128 MMHG | HEART RATE: 100 BPM | OXYGEN SATURATION: 99 % | BODY MASS INDEX: 27.49 KG/M2 | WEIGHT: 192 LBS | HEIGHT: 70 IN | DIASTOLIC BLOOD PRESSURE: 70 MMHG

## 2025-01-06 DIAGNOSIS — N52.9 VASCULOGENIC ERECTILE DYSFUNCTION, UNSPECIFIED VASCULOGENIC ERECTILE DYSFUNCTION TYPE: ICD-10-CM

## 2025-01-06 DIAGNOSIS — R05.3 CHRONIC COUGH: ICD-10-CM

## 2025-01-06 DIAGNOSIS — J43.9 CHRONIC EMPHYSEMA SYNDROME (MULTI): ICD-10-CM

## 2025-01-06 DIAGNOSIS — M79.644 PAIN IN FINGER OF BOTH HANDS: ICD-10-CM

## 2025-01-06 DIAGNOSIS — J44.9 CHRONIC OBSTRUCTIVE PULMONARY DISEASE, UNSPECIFIED COPD TYPE (MULTI): ICD-10-CM

## 2025-01-06 DIAGNOSIS — N40.1 BENIGN PROSTATIC HYPERPLASIA WITH URINARY FREQUENCY: ICD-10-CM

## 2025-01-06 DIAGNOSIS — Z87.891 PERSONAL HISTORY OF NICOTINE DEPENDENCE: ICD-10-CM

## 2025-01-06 DIAGNOSIS — Z72.0 TOBACCO USE: Primary | ICD-10-CM

## 2025-01-06 DIAGNOSIS — M79.645 PAIN IN FINGER OF BOTH HANDS: ICD-10-CM

## 2025-01-06 DIAGNOSIS — R35.0 BENIGN PROSTATIC HYPERPLASIA WITH URINARY FREQUENCY: ICD-10-CM

## 2025-01-06 DIAGNOSIS — E78.00 PURE HYPERCHOLESTEROLEMIA: ICD-10-CM

## 2025-01-06 DIAGNOSIS — R05.1 ACUTE COUGH: ICD-10-CM

## 2025-01-06 PROCEDURE — G0439 PPPS, SUBSEQ VISIT: HCPCS | Performed by: STUDENT IN AN ORGANIZED HEALTH CARE EDUCATION/TRAINING PROGRAM

## 2025-01-06 PROCEDURE — 1159F MED LIST DOCD IN RCRD: CPT | Performed by: STUDENT IN AN ORGANIZED HEALTH CARE EDUCATION/TRAINING PROGRAM

## 2025-01-06 PROCEDURE — 99215 OFFICE O/P EST HI 40 MIN: CPT | Performed by: STUDENT IN AN ORGANIZED HEALTH CARE EDUCATION/TRAINING PROGRAM

## 2025-01-06 PROCEDURE — 1126F AMNT PAIN NOTED NONE PRSNT: CPT | Performed by: STUDENT IN AN ORGANIZED HEALTH CARE EDUCATION/TRAINING PROGRAM

## 2025-01-06 PROCEDURE — 99214 OFFICE O/P EST MOD 30 MIN: CPT | Performed by: STUDENT IN AN ORGANIZED HEALTH CARE EDUCATION/TRAINING PROGRAM

## 2025-01-06 PROCEDURE — 3008F BODY MASS INDEX DOCD: CPT | Performed by: STUDENT IN AN ORGANIZED HEALTH CARE EDUCATION/TRAINING PROGRAM

## 2025-01-06 PROCEDURE — 1123F ACP DISCUSS/DSCN MKR DOCD: CPT | Performed by: STUDENT IN AN ORGANIZED HEALTH CARE EDUCATION/TRAINING PROGRAM

## 2025-01-06 RX ORDER — PROMETHAZINE HYDROCHLORIDE 6.25 MG/5ML
6.25 SYRUP ORAL DAILY PRN
Qty: 360 ML | Refills: 2 | Status: SHIPPED | OUTPATIENT
Start: 2025-01-06 | End: 2025-01-09 | Stop reason: SDUPTHER

## 2025-01-06 RX ORDER — TADALAFIL 5 MG/1
5 TABLET ORAL DAILY PRN
Qty: 90 TABLET | Refills: 3 | Status: SHIPPED | OUTPATIENT
Start: 2025-01-06 | End: 2026-01-06

## 2025-01-06 RX ORDER — TADALAFIL 5 MG/1
5 TABLET ORAL DAILY PRN
Qty: 90 TABLET | Refills: 3 | Status: SHIPPED | OUTPATIENT
Start: 2025-01-06 | End: 2025-01-06

## 2025-01-06 RX ORDER — BENZONATATE 200 MG/1
200 CAPSULE ORAL 2 TIMES DAILY
Qty: 180 CAPSULE | Refills: 1 | Status: SHIPPED | OUTPATIENT
Start: 2025-01-06 | End: 2025-01-09 | Stop reason: SDUPTHER

## 2025-01-06 RX ORDER — FLUTICASONE PROPIONATE AND SALMETEROL XINAFOATE 115; 21 UG/1; UG/1
2 AEROSOL, METERED RESPIRATORY (INHALATION)
Qty: 12 G | Refills: 11 | Status: SHIPPED | OUTPATIENT
Start: 2025-01-06 | End: 2026-01-06

## 2025-01-06 ASSESSMENT — COGNITIVE AND FUNCTIONAL STATUS - GENERAL: VERBAL FLUENCY - ANIMAL NAMES (0 TO 25): 3

## 2025-01-06 ASSESSMENT — PATIENT HEALTH QUESTIONNAIRE - PHQ9
SUM OF ALL RESPONSES TO PHQ9 QUESTIONS 1 AND 2: 0
1. LITTLE INTEREST OR PLEASURE IN DOING THINGS: NOT AT ALL
2. FEELING DOWN, DEPRESSED OR HOPELESS: NOT AT ALL

## 2025-01-06 ASSESSMENT — PAIN SCALES - GENERAL: PAINLEVEL_OUTOF10: 0-NO PAIN

## 2025-01-06 NOTE — PROGRESS NOTES
Subjective   Patient ID: Gregor Finney is a 65 y.o. male who presents for Annual Exam (Medicare annual wellness).    HPI  66 yo male here for Medicare wellness  MW  Colonoscopy UTD  Immunizations: UTD on Flu, declines Tdap, UTD on shingles and penumonia  Well balanced diet  Exercises regularly  Smokes tobacco daily  2. Stiffness of fingers on hands B/L  Years of working with hands  3. BPH/ED  On sildenafil, would like to try cialis  4. Emphysema  On breo, albuterol  5. HLD  On atorvastatin 20 mg  6. Cough  On tessalon perles prn, promethazine nightly    Review of Systems  All pertinent positive symptoms are included in the history of present illness.    All other systems have been reviewed and are negative and noncontributory to this patient's current ailments.     Allergies   Allergen Reactions    Augmentin [Amoxicillin-Pot Clavulanate] GI Upset    Bupropion Agitation    Levofloxacin Other     foot, knee pain    Sulfa (Sulfonamide Antibiotics) Rash and Unknown    Tetracycline Rash        Immunization History   Administered Date(s) Administered    DTaP vaccine, pediatric  (INFANRIX) 03/28/2012    Flu vaccine (IIV4), preservative free *Check age/dose* 10/12/2020, 10/06/2021, 09/20/2022    Flu vaccine, quadrivalent, high-dose, preservative free, age 65y+ (FLUZONE) 10/16/2023    Flu vaccine, trivalent, preservative free, HIGH-DOSE, age 65y+ (Fluzone) 09/04/2024    Hep A / Hep B 09/03/2021    Hepatitis A vaccine, age 19 years and greater (HAVRIX) 09/03/2021    Hepatitis B vaccine, adult *Check Product/Dose* 09/03/2021, 11/10/2021    Moderna COVID-19 vaccine, bivalent, blue cap/gray label *Check age/dose* 11/18/2022    Pneumococcal conjugate vaccine, 13-valent (PREVNAR 13) 11/19/2019    Pneumococcal conjugate vaccine, 20-valent (PREVNAR 20) 01/10/2024    Pneumococcal polysaccharide vaccine, 23-valent, age 2 years and older (PNEUMOVAX 23) 10/02/2018    Td vaccine, age 7 years and older (TDVAX) 01/01/2000    Zoster  "vaccine, recombinant, adult (SHINGRIX) 11/19/2019       Objective   Vitals:    01/06/25 1320   BP: 128/70   Pulse: 100   SpO2: 99%   Weight: 87.1 kg (192 lb)   Height: 1.778 m (5' 10\")       Physical Exam  CONSTITUTIONAL - well nourished, well developed, looks like stated age, in no acute distress  SKIN - normal skin color and pigmentation. No rash, lesions, or nodules visualized  HEAD - no trauma, normocephalic  EYES - extraocular muscles are intact  ENT - atraumatic  NECK - no neck mass was observed  LUNGS - CTA B/L  CARDIAC - regular rate and regular rhythm  ABDOMEN - no organomegaly, soft, nontender, nondistended  EXTREMITIES - no edema, no deformities  MSK - moves limbs equally  NEUROLOGICAL - alert, oriented and no focal signs  PSYCHIATRIC - alert, pleasant and cordial, age-appropriate  IMMUNOLOGIC - no cervical lymphadenopathy     Assessment/Plan   66 yo male here for Medicare wellness  MW  Colonoscopy UTD  Immunizations: UTD on Flu, declines Tdap, UTD on shingles and penumonia  Well balanced diet  Exercises regularly  Smokes tobacco daily  2. Stiffness of fingers on hands B/L  Hand ortho referral  3. BPH/ED  Switch from sildalifil to cialis 5 mg daily  4. Emphysema  Switch to advair, albuterol  5. HLD  Continue atorvastatin 20 mg  6. Cough  Continue tessalon perles prn, promethazine nightly    RTC annually or sooner if needed  "

## 2025-01-09 ENCOUNTER — TELEPHONE (OUTPATIENT)
Dept: PRIMARY CARE | Facility: CLINIC | Age: 65
End: 2025-01-09
Payer: COMMERCIAL

## 2025-01-09 DIAGNOSIS — J43.9 CHRONIC EMPHYSEMA SYNDROME (MULTI): ICD-10-CM

## 2025-01-09 DIAGNOSIS — N52.9 VASCULOGENIC ERECTILE DYSFUNCTION, UNSPECIFIED VASCULOGENIC ERECTILE DYSFUNCTION TYPE: ICD-10-CM

## 2025-01-09 DIAGNOSIS — R05.1 ACUTE COUGH: ICD-10-CM

## 2025-01-10 RX ORDER — SILDENAFIL 100 MG/1
100 TABLET, FILM COATED ORAL DAILY PRN
Qty: 10 TABLET | Refills: 3 | Status: SHIPPED | OUTPATIENT
Start: 2025-01-10 | End: 2026-01-10

## 2025-01-10 RX ORDER — BENZONATATE 200 MG/1
200 CAPSULE ORAL 2 TIMES DAILY
Qty: 180 CAPSULE | Refills: 1 | Status: SHIPPED | OUTPATIENT
Start: 2025-01-10

## 2025-01-10 RX ORDER — PROMETHAZINE HYDROCHLORIDE 6.25 MG/5ML
6.25 SYRUP ORAL DAILY PRN
Qty: 360 ML | Refills: 2 | Status: SHIPPED | OUTPATIENT
Start: 2025-01-10

## 2025-01-15 ENCOUNTER — APPOINTMENT (OUTPATIENT)
Dept: PRIMARY CARE | Facility: CLINIC | Age: 65
End: 2025-01-15
Payer: COMMERCIAL

## 2025-01-15 DIAGNOSIS — J43.9 CHRONIC EMPHYSEMA SYNDROME (MULTI): Primary | ICD-10-CM

## 2025-01-15 DIAGNOSIS — J44.9 CHRONIC OBSTRUCTIVE PULMONARY DISEASE, UNSPECIFIED COPD TYPE (MULTI): ICD-10-CM

## 2025-01-15 RX ORDER — FLUTICASONE FUROATE AND VILANTEROL 100; 25 UG/1; UG/1
1 POWDER RESPIRATORY (INHALATION) DAILY
COMMUNITY
End: 2025-01-15 | Stop reason: SDUPTHER

## 2025-01-15 NOTE — TELEPHONE ENCOUNTER
Pt called requesting refill, pt state that this was not sent to his pharmacy. Pharmacy is [facility] in [place]. Pt last seen on 1/6/2025.

## 2025-01-16 RX ORDER — FLUTICASONE FUROATE AND VILANTEROL 100; 25 UG/1; UG/1
1 POWDER RESPIRATORY (INHALATION) DAILY
Qty: 30 EACH | Refills: 5 | Status: SHIPPED | OUTPATIENT
Start: 2025-01-16

## 2025-01-22 ENCOUNTER — TELEPHONE (OUTPATIENT)
Dept: PRIMARY CARE | Facility: CLINIC | Age: 65
End: 2025-01-22
Payer: COMMERCIAL

## 2025-01-22 NOTE — TELEPHONE ENCOUNTER
Patient dropped off a form needed for his ophthalmologist for cataract surgery - had his physical 1/6/25 - asking we complete form and send in - call him when completed. [phone]

## 2025-02-14 ENCOUNTER — TELEPHONE (OUTPATIENT)
Dept: RADIOLOGY | Facility: HOSPITAL | Age: 65
End: 2025-02-14
Payer: COMMERCIAL

## 2025-03-19 ENCOUNTER — HOSPITAL ENCOUNTER (OUTPATIENT)
Dept: RADIOLOGY | Facility: HOSPITAL | Age: 65
Discharge: HOME | End: 2025-03-19
Payer: MEDICARE

## 2025-03-19 DIAGNOSIS — Z87.891 PERSONAL HISTORY OF NICOTINE DEPENDENCE: ICD-10-CM

## 2025-03-19 DIAGNOSIS — Z72.0 TOBACCO USE: ICD-10-CM

## 2025-03-19 PROCEDURE — 71271 CT THORAX LUNG CANCER SCR C-: CPT

## 2025-03-20 ENCOUNTER — TELEPHONE (OUTPATIENT)
Dept: PRIMARY CARE | Facility: CLINIC | Age: 65
End: 2025-03-20
Payer: COMMERCIAL

## 2025-05-01 DIAGNOSIS — J44.9 CHRONIC OBSTRUCTIVE PULMONARY DISEASE, UNSPECIFIED COPD TYPE (MULTI): ICD-10-CM

## 2025-05-01 DIAGNOSIS — E78.00 PURE HYPERCHOLESTEROLEMIA: ICD-10-CM

## 2025-05-01 DIAGNOSIS — J43.9 CHRONIC EMPHYSEMA SYNDROME (MULTI): ICD-10-CM

## 2025-05-01 NOTE — TELEPHONE ENCOUNTER
Pt states he was only prescribed 5 of these- he states he usually gets them for the year 7 more for the year     >Requesting x1 year refills

## 2025-05-02 RX ORDER — ATORVASTATIN CALCIUM 20 MG/1
20 TABLET, FILM COATED ORAL DAILY
Qty: 90 TABLET | Refills: 1 | Status: SHIPPED | OUTPATIENT
Start: 2025-05-02

## 2025-05-02 RX ORDER — FLUTICASONE FUROATE AND VILANTEROL 100; 25 UG/1; UG/1
1 POWDER RESPIRATORY (INHALATION) DAILY
Qty: 30 EACH | Refills: 6 | Status: SHIPPED | OUTPATIENT
Start: 2025-05-02

## 2025-05-30 ENCOUNTER — TELEPHONE (OUTPATIENT)
Dept: PRIMARY CARE | Facility: CLINIC | Age: 65
End: 2025-05-30
Payer: COMMERCIAL

## 2025-06-01 PROBLEM — Z20.5 CONTACT WITH AND (SUSPECTED) EXPOSURE TO VIRAL HEPATITIS: Status: RESOLVED | Noted: 2023-10-16 | Resolved: 2025-06-01

## 2025-06-01 PROBLEM — R74.01 TRANSAMINASEMIA: Status: RESOLVED | Noted: 2023-09-08 | Resolved: 2025-06-01

## 2025-06-01 PROBLEM — K64.5 THROMBOSED EXTERNAL HEMORRHOIDS: Status: RESOLVED | Noted: 2023-12-15 | Resolved: 2025-06-01

## 2025-06-01 PROBLEM — K76.0 HEPATIC STEATOSIS: Status: RESOLVED | Noted: 2023-09-08 | Resolved: 2025-06-01

## 2025-06-02 PROBLEM — Z86.0100 HISTORY OF COLONIC POLYPS: Status: ACTIVE | Noted: 2025-06-02

## 2025-06-02 NOTE — PROGRESS NOTES
[facility]: [facility]  PROGRESS NOTE      Gregor Finney is a 65 y.o. male that is presenting today to establish care with new provider.  He is a former patient of Dr. Linares - last seen 1/6/2025 for annual cpe.  States he is doing well.  Needs a refill on his inhaler.  Concerned with a varicose vein in his left hand that is getting bigger    Assessment/Plan   Assessment & Plan  Pure hypercholesterolemia  Continue atorvastatin 20mg daily    Gastroesophageal reflux disease without esophagitis  No symptoms  Orders:    CBC; Future    Comprehensive Metabolic Panel; Future    Chronic obstructive pulmonary disease, unspecified COPD type (Multi)  Will change to advair    Orders:    fluticasone propion-salmeteroL (Advair HFA) 115-21 mcg/actuation inhaler; Inhale 2 puffs 2 times a day. Rinse mouth with water after use to reduce aftertaste and incidence of candidiasis. Do not swallow.    Current smoker  Smokes 1 ppd   Smoking cessation encouraged       Chronic cough    Orders:    fluticasone propion-salmeteroL (Advair HFA) 115-21 mcg/actuation inhaler; Inhale 2 puffs 2 times a day. Rinse mouth with water after use to reduce aftertaste and incidence of candidiasis. Do not swallow.    Varicose veins of left upper extremity  Will refer to ortho hand for evaluation       Vitamin D deficiency    Orders:    Vitamin D 25-Hydroxy,Total (for eval of Vitamin D levels); Future      Subjective   HPI  Review of Systems   Constitutional: Negative.  Negative for chills and fever.   HENT: Negative.     Respiratory: Negative.  Negative for cough and wheezing.    Cardiovascular: Negative.  Negative for chest pain and palpitations.   Gastrointestinal: Negative.    Endocrine: Negative.    Musculoskeletal: Negative.    Skin:  Negative for rash and wound.        C/o bruising more easily than before   Allergic/Immunologic: Negative.    Neurological: Negative.  Negative for dizziness, weakness, light-headedness and  numbness.   Psychiatric/Behavioral: Negative.        Objective   Vitals:    06/03/25 1017   BP: 105/66   Pulse: (!) 111   Temp: 36.7 °C (98.1 °F)   SpO2: 97%      Physical Exam  Vitals reviewed.   Constitutional:       Appearance: Normal appearance.   Eyes:      Extraocular Movements: Extraocular movements intact.   Neck:      Vascular: No carotid bruit.   Cardiovascular:      Rate and Rhythm: Regular rhythm. Tachycardia present.      Pulses: Normal pulses.      Heart sounds: Normal heart sounds. No murmur heard.  Pulmonary:      Effort: Pulmonary effort is normal.      Breath sounds: Normal breath sounds. No wheezing.      Comments: Diminished lung sounds in the bilateral lung bases  Abdominal:      General: Bowel sounds are normal.      Palpations: Abdomen is soft.   Skin:     General: Skin is warm and dry.      Capillary Refill: Capillary refill takes less than 2 seconds.      Findings: Bruising (generalized) present.   Neurological:      General: No focal deficit present.      Mental Status: He is alert and oriented to person, place, and time.   Psychiatric:         Mood and Affect: Mood normal.         Behavior: Behavior normal.         Thought Content: Thought content normal.         Judgment: Judgment normal.       Diagnostic Results   Lab Results   Component Value Date    GLUCOSE 100 (H) 12/17/2024    CALCIUM 9.9 12/17/2024     12/17/2024    K 5.0 12/17/2024    CO2 28 12/17/2024     12/17/2024    BUN 16 12/17/2024    CREATININE 1.10 12/17/2024     Lab Results   Component Value Date    ALT 26 12/17/2024    AST 18 12/17/2024    ALKPHOS 109 12/17/2024    BILITOT 0.5 12/17/2024     Lab Results   Component Value Date    WBC 12.6 (H) 12/17/2024    HGB 15.9 12/17/2024    HCT 48.3 12/17/2024    MCV 96 12/17/2024     (H) 12/17/2024     Lab Results   Component Value Date    CHOL 150 09/06/2024    CHOL 150 07/11/2023     Lab Results   Component Value Date    HDL 30.5 09/06/2024    HDL 33 (L)  "07/11/2023     Lab Results   Component Value Date    LDLCALC 83 09/06/2024    LDLCALC 85 07/11/2023     Lab Results   Component Value Date    TRIG 185 (H) 09/06/2024    TRIG 160 (H) 07/11/2023     No components found for: \"CHOLHDL\"  Lab Results   Component Value Date    HGBA1C 5.8 (H) 09/06/2024     Other labs not included in the list above were reviewed either before or during this encounter.    History    Medical History[1]  Surgical History[2]  Family History[3]  Social History     Socioeconomic History    Marital status:      Spouse name: Not on file    Number of children: Not on file    Years of education: Not on file    Highest education level: Not on file   Occupational History    Not on file   Tobacco Use    Smoking status: Every Day     Current packs/day: 1.00     Average packs/day: 1 pack/day for 54.4 years (54.4 ttl pk-yrs)     Types: Cigarettes     Start date: 1971     Passive exposure: Current    Smokeless tobacco: Never   Vaping Use    Vaping status: Never Used   Substance and Sexual Activity    Alcohol use: Yes     Comment: sometimes    Drug use: Never    Sexual activity: Never   Other Topics Concern    Not on file   Social History Narrative    Not on file     Social Drivers of Health     Financial Resource Strain: Not on file   Food Insecurity: Not on file   Transportation Needs: Not on file   Physical Activity: Not on file   Stress: Not on file   Social Connections: Not on file   Intimate Partner Violence: Not on file   Housing Stability: Not on file     Allergies[4]  Medications Ordered Prior to Encounter[5]  Immunization History   Administered Date(s) Administered    DTaP vaccine, pediatric  (INFANRIX) 03/28/2012    Flu vaccine (IIV4), preservative free *Check age/dose* 10/12/2020, 10/06/2021, 09/20/2022    Flu vaccine, quadrivalent, high-dose, preservative free, age 65y+ (FLUZONE) 10/16/2023    Flu vaccine, trivalent, preservative free, HIGH-DOSE, age 65y+ (Fluzone) 09/04/2024    Hep A / " Hep B 2021    Hepatitis A vaccine, age 19 years and greater (HAVRIX) 2021    Hepatitis B vaccine, adult *Check Product/Dose* 2021, 11/10/2021    Moderna COVID-19 vaccine, bivalent, blue cap/gray label *Check age/dose* 2022    Moderna SARS-CoV-2 Vaccination 2021, 2021, 2022, 2022    Pneumococcal conjugate vaccine, 13-valent (PREVNAR 13) 2019    Pneumococcal conjugate vaccine, 20-valent (PREVNAR 20) 01/10/2024    Pneumococcal polysaccharide vaccine, 23-valent, age 2 years and older (PNEUMOVAX 23) 10/02/2018    Td vaccine, age 7 years and older (TDVAX) 2000    Zoster vaccine, recombinant, adult (SHINGRIX) 2019     Patient's medical history was reviewed and updated either before or during this encounter.       Hailey Gómez, APRN-CNP         [1]   Past Medical History:  Diagnosis Date    Adverse effect of anesthesia     Chronic cough     COPD (chronic obstructive pulmonary disease) (Multi)     Fatty liver     GERD (gastroesophageal reflux disease)     H/O leukocytosis     H/O thrombocytosis     Hyperlipidemia     Personal history of other endocrine, nutritional and metabolic disease     History of hypercholesterolemia   [2]   Past Surgical History:  Procedure Laterality Date    BACK SURGERY      BLEPHAROPLASTY Bilateral     bilateral upper blepharoplasty    COLONOSCOPY  2024    with excision hemorrhoid     OTHER SURGICAL HISTORY  2019    Nasal septoplasty    OTHER SURGICAL HISTORY Left 2019    Knee surgery    OTHER SURGICAL HISTORY Left 2019    Elbow surgery   [3]   Family History  Problem Relation Name Age of Onset    COPD Mother      Emphysema Mother      No Known Problems Father           in plane crash    No Known Problems Brother      No Known Problems Brother      Stroke Other      Diabetes Other      Cancer Other      Heart attack Other     [4]   Allergies  Allergen Reactions    Augmentin [Amoxicillin-Pot  Clavulanate] GI Upset    Bupropion Agitation    Levofloxacin Other     foot, knee pain    Sulfa (Sulfonamide Antibiotics) Rash and Unknown    Tetracycline Rash   [5]   Current Outpatient Medications on File Prior to Visit   Medication Sig Dispense Refill    acyclovir (Zovirax) 5 % ointment Apply 1 Application topically if needed.      albuterol 2.5 mg /3 mL (0.083 %) nebulizer solution Take 3 mL (2.5 mg) by nebulization every 6 hours if needed for wheezing or shortness of breath. 90 mL 1    albuterol 90 mcg/actuation inhaler Inhale 1-2 puffs every 6 hours if needed for wheezing. 18 g 1    atorvastatin (Lipitor) 20 mg tablet Take 1 tablet (20 mg) by mouth once daily. 1 tablet Orally Once a day for 90 day(s) 90 tablet 1    benzonatate (Tessalon) 200 mg capsule Take 1 capsule (200 mg) by mouth 2 times a day. Do not crush or chew. 180 capsule 1    famciclovir (Famvir) 125 mg tablet Take 2 tablets (250 mg) by mouth 2 times a day. As needed 120 tablet 1    fluticasone furoate-vilanteroL (Breo Ellipta) 100-25 mcg/dose inhaler Inhale 1 puff once daily. 30 each 6    fluticasone propion-salmeteroL (Advair HFA) 115-21 mcg/actuation inhaler Inhale 2 puffs 2 times a day. Rinse mouth with water after use to reduce aftertaste and incidence of candidiasis. Do not swallow. 12 g 11    ibuprofen 600 mg tablet Take 1 tablet (600 mg) by mouth every 6 hours if needed for moderate pain (4 - 6) for up to 30 doses. 30 tablet 0    promethazine (Phenergan) 6.25 mg/5 mL syrup Take 5 mL (6.25 mg) by mouth once daily as needed for nausea or vomiting. 360 mL 2    sildenafil (Viagra) 100 mg tablet Take 1 tablet (100 mg) by mouth once daily as needed for erectile dysfunction. 10 tablet 3    tadalafil (Cialis) 5 mg tablet Take 1 tablet (5 mg) by mouth once daily as needed for erectile dysfunction. 90 tablet 3     No current facility-administered medications on file prior to visit.

## 2025-06-03 ENCOUNTER — TELEPHONE (OUTPATIENT)
Dept: PRIMARY CARE | Facility: CLINIC | Age: 65
End: 2025-06-03

## 2025-06-03 ENCOUNTER — OFFICE VISIT (OUTPATIENT)
Dept: PRIMARY CARE | Facility: CLINIC | Age: 65
End: 2025-06-03
Payer: MEDICARE

## 2025-06-03 VITALS
OXYGEN SATURATION: 97 % | HEART RATE: 111 BPM | BODY MASS INDEX: 26.92 KG/M2 | HEIGHT: 70 IN | TEMPERATURE: 98.1 F | SYSTOLIC BLOOD PRESSURE: 105 MMHG | DIASTOLIC BLOOD PRESSURE: 66 MMHG | WEIGHT: 188 LBS

## 2025-06-03 DIAGNOSIS — E06.3 HYPOTHYROIDISM DUE TO HASHIMOTO THYROIDITIS: ICD-10-CM

## 2025-06-03 DIAGNOSIS — K21.9 GASTROESOPHAGEAL REFLUX DISEASE WITHOUT ESOPHAGITIS: ICD-10-CM

## 2025-06-03 DIAGNOSIS — E55.9 VITAMIN D DEFICIENCY: ICD-10-CM

## 2025-06-03 DIAGNOSIS — E78.00 PURE HYPERCHOLESTEROLEMIA: Primary | ICD-10-CM

## 2025-06-03 DIAGNOSIS — J44.9 CHRONIC OBSTRUCTIVE PULMONARY DISEASE, UNSPECIFIED COPD TYPE (MULTI): ICD-10-CM

## 2025-06-03 DIAGNOSIS — R05.3 CHRONIC COUGH: ICD-10-CM

## 2025-06-03 DIAGNOSIS — I86.8: ICD-10-CM

## 2025-06-03 DIAGNOSIS — F17.200 CURRENT SMOKER: ICD-10-CM

## 2025-06-03 PROBLEM — J45.909 REACTIVE AIRWAY DISEASE (HHS-HCC): Status: RESOLVED | Noted: 2023-09-08 | Resolved: 2025-06-03

## 2025-06-03 PROBLEM — K64.4 EXTERNAL HEMORRHOID: Status: RESOLVED | Noted: 2024-02-16 | Resolved: 2025-06-03

## 2025-06-03 PROBLEM — J31.0 CHRONIC RHINITIS: Status: RESOLVED | Noted: 2023-09-08 | Resolved: 2025-06-03

## 2025-06-03 PROBLEM — F19.21 HISTORY OF SUBSTANCE DEPENDENCE (MULTI): Status: RESOLVED | Noted: 2023-01-29 | Resolved: 2025-06-03

## 2025-06-03 PROBLEM — J30.9 ALLERGIC RHINITIS: Status: RESOLVED | Noted: 2024-04-04 | Resolved: 2025-06-03

## 2025-06-03 PROBLEM — K64.5 THROMBOSED EXTERNAL HEMORRHOID: Status: RESOLVED | Noted: 2023-12-15 | Resolved: 2025-06-03

## 2025-06-03 PROBLEM — E03.9 HYPOTHYROIDISM: Status: RESOLVED | Noted: 2023-09-08 | Resolved: 2025-06-03

## 2025-06-03 PROBLEM — R04.0 EPISTAXIS: Status: RESOLVED | Noted: 2023-09-08 | Resolved: 2025-06-03

## 2025-06-03 PROBLEM — Z86.39 HISTORY OF HYPERCHOLESTEROLEMIA: Status: RESOLVED | Noted: 2024-04-04 | Resolved: 2025-06-03

## 2025-06-03 PROBLEM — M54.12 CERVICAL RADICULITIS: Status: RESOLVED | Noted: 2023-09-08 | Resolved: 2025-06-03

## 2025-06-03 PROCEDURE — 1126F AMNT PAIN NOTED NONE PRSNT: CPT | Performed by: NURSE PRACTITIONER

## 2025-06-03 PROCEDURE — 3008F BODY MASS INDEX DOCD: CPT | Performed by: NURSE PRACTITIONER

## 2025-06-03 PROCEDURE — 99214 OFFICE O/P EST MOD 30 MIN: CPT | Performed by: NURSE PRACTITIONER

## 2025-06-03 PROCEDURE — 1159F MED LIST DOCD IN RCRD: CPT | Performed by: NURSE PRACTITIONER

## 2025-06-03 RX ORDER — FLUTICASONE PROPIONATE AND SALMETEROL XINAFOATE 115; 21 UG/1; UG/1
2 AEROSOL, METERED RESPIRATORY (INHALATION)
Qty: 36 G | Refills: 3 | Status: SHIPPED | OUTPATIENT
Start: 2025-06-03 | End: 2026-06-03

## 2025-06-03 RX ORDER — FLUTICASONE PROPIONATE AND SALMETEROL XINAFOATE 115; 21 UG/1; UG/1
2 AEROSOL, METERED RESPIRATORY (INHALATION)
Qty: 12 G | Refills: 11 | Status: SHIPPED | OUTPATIENT
Start: 2025-06-03 | End: 2025-06-03 | Stop reason: ENTERED-IN-ERROR

## 2025-06-03 ASSESSMENT — PATIENT HEALTH QUESTIONNAIRE - PHQ9
1. LITTLE INTEREST OR PLEASURE IN DOING THINGS: NOT AT ALL
1. LITTLE INTEREST OR PLEASURE IN DOING THINGS: NOT AT ALL
2. FEELING DOWN, DEPRESSED OR HOPELESS: NOT AT ALL
SUM OF ALL RESPONSES TO PHQ9 QUESTIONS 1 AND 2: 0
SUM OF ALL RESPONSES TO PHQ9 QUESTIONS 1 AND 2: 0
2. FEELING DOWN, DEPRESSED OR HOPELESS: NOT AT ALL

## 2025-06-03 ASSESSMENT — ENCOUNTER SYMPTOMS
WHEEZING: 0
PALPITATIONS: 0
ALLERGIC/IMMUNOLOGIC NEGATIVE: 1
CHILLS: 0
MUSCULOSKELETAL NEGATIVE: 1
DIZZINESS: 0
ENDOCRINE NEGATIVE: 1
NUMBNESS: 0
WEAKNESS: 0
CONSTITUTIONAL NEGATIVE: 1
RESPIRATORY NEGATIVE: 1
WOUND: 0
NEUROLOGICAL NEGATIVE: 1
GASTROINTESTINAL NEGATIVE: 1
PSYCHIATRIC NEGATIVE: 1
COUGH: 0
CARDIOVASCULAR NEGATIVE: 1
FEVER: 0
LIGHT-HEADEDNESS: 0

## 2025-06-03 ASSESSMENT — PAIN SCALES - GENERAL: PAINLEVEL_OUTOF10: 0-NO PAIN

## 2025-06-03 NOTE — ASSESSMENT & PLAN NOTE
Will change to advair    Orders:    fluticasone propion-salmeteroL (Advair HFA) 115-21 mcg/actuation inhaler; Inhale 2 puffs 2 times a day. Rinse mouth with water after use to reduce aftertaste and incidence of candidiasis. Do not swallow.

## 2025-06-04 NOTE — TELEPHONE ENCOUNTER
Pharmacist was able to get approval for brand name Advair, no co pay. Needs to order it, should be in tomorrow 6/5.    Lmovm for pt pharmacy ordering, keep in touch with pharmacy as to when it can be picked up.

## 2025-06-04 NOTE — TELEPHONE ENCOUNTER
Advised pharmacy, they tried to run it, received message not on formulary. Pharmacy questioned if there is a dose limit? Suggested 45/21 instead of 115 for approval?

## 2025-06-10 LAB
25(OH)D3+25(OH)D2 SERPL-MCNC: 56 NG/ML (ref 30–100)
ALBUMIN SERPL-MCNC: 4.4 G/DL (ref 3.6–5.1)
ALP SERPL-CCNC: 98 U/L (ref 35–144)
ALT SERPL-CCNC: 25 U/L (ref 9–46)
ANION GAP SERPL CALCULATED.4IONS-SCNC: 7 MMOL/L (CALC) (ref 7–17)
AST SERPL-CCNC: 17 U/L (ref 10–35)
BILIRUB SERPL-MCNC: 0.4 MG/DL (ref 0.2–1.2)
BUN SERPL-MCNC: 15 MG/DL (ref 7–25)
CALCIUM SERPL-MCNC: 9.6 MG/DL (ref 8.6–10.3)
CHLORIDE SERPL-SCNC: 106 MMOL/L (ref 98–110)
CO2 SERPL-SCNC: 27 MMOL/L (ref 20–32)
CREAT SERPL-MCNC: 1.12 MG/DL (ref 0.7–1.35)
EGFRCR SERPLBLD CKD-EPI 2021: 72 ML/MIN/1.73M2
ERYTHROCYTE [DISTWIDTH] IN BLOOD BY AUTOMATED COUNT: 14.4 % (ref 11–15)
GLUCOSE SERPL-MCNC: 112 MG/DL (ref 65–99)
HCT VFR BLD AUTO: 47.1 % (ref 38.5–50)
HGB BLD-MCNC: 15.5 G/DL (ref 13.2–17.1)
MCH RBC QN AUTO: 32.3 PG (ref 27–33)
MCHC RBC AUTO-ENTMCNC: 32.9 G/DL (ref 32–36)
MCV RBC AUTO: 98.1 FL (ref 80–100)
PLATELET # BLD AUTO: 430 THOUSAND/UL (ref 140–400)
PMV BLD REES-ECKER: 8.7 FL (ref 7.5–12.5)
POTASSIUM SERPL-SCNC: 4.5 MMOL/L (ref 3.5–5.3)
PROT SERPL-MCNC: 6.6 G/DL (ref 6.1–8.1)
RBC # BLD AUTO: 4.8 MILLION/UL (ref 4.2–5.8)
SODIUM SERPL-SCNC: 140 MMOL/L (ref 135–146)
WBC # BLD AUTO: 13.4 THOUSAND/UL (ref 3.8–10.8)

## 2025-07-15 DIAGNOSIS — N52.9 VASCULOGENIC ERECTILE DYSFUNCTION, UNSPECIFIED VASCULOGENIC ERECTILE DYSFUNCTION TYPE: ICD-10-CM

## 2025-07-15 DIAGNOSIS — R05.1 ACUTE COUGH: ICD-10-CM

## 2025-07-15 DIAGNOSIS — J43.9 CHRONIC EMPHYSEMA SYNDROME (MULTI): ICD-10-CM

## 2025-07-15 RX ORDER — PROMETHAZINE HYDROCHLORIDE 6.25 MG/5ML
6.25 SYRUP ORAL DAILY PRN
Qty: 360 ML | Refills: 3 | Status: SHIPPED | OUTPATIENT
Start: 2025-07-15

## 2025-07-15 RX ORDER — BENZONATATE 200 MG/1
200 CAPSULE ORAL 2 TIMES DAILY
Qty: 180 CAPSULE | Refills: 3 | Status: SHIPPED | OUTPATIENT
Start: 2025-07-15

## 2025-07-15 RX ORDER — SILDENAFIL 100 MG/1
100 TABLET, FILM COATED ORAL DAILY PRN
Qty: 10 TABLET | Refills: 3 | Status: SHIPPED | OUTPATIENT
Start: 2025-07-15 | End: 2026-07-15

## 2025-08-31 DIAGNOSIS — J44.9 CHRONIC OBSTRUCTIVE PULMONARY DISEASE, UNSPECIFIED COPD TYPE (MULTI): ICD-10-CM

## 2025-08-31 DIAGNOSIS — R05.3 CHRONIC COUGH: ICD-10-CM

## 2025-09-02 DIAGNOSIS — R05.3 CHRONIC COUGH: ICD-10-CM

## 2025-09-02 DIAGNOSIS — J44.9 CHRONIC OBSTRUCTIVE PULMONARY DISEASE, UNSPECIFIED COPD TYPE (MULTI): ICD-10-CM

## 2025-09-02 RX ORDER — FLUTICASONE PROPIONATE AND SALMETEROL XINAFOATE 45; 21 UG/1; UG/1
2 AEROSOL, METERED RESPIRATORY (INHALATION)
Qty: 18 G | Refills: 3 | Status: SHIPPED | OUTPATIENT
Start: 2025-09-02 | End: 2025-09-02

## 2025-09-02 RX ORDER — FLUTICASONE PROPIONATE AND SALMETEROL XINAFOATE 45; 21 UG/1; UG/1
2 AEROSOL, METERED RESPIRATORY (INHALATION)
Qty: 18 G | Refills: 3 | Status: SHIPPED | OUTPATIENT
Start: 2025-09-02

## (undated) DEVICE — GLOVE, SURGICAL, PROTEXIS PI BLUE W/NEUTHERA, 8.0, PF, LF

## (undated) DEVICE — GLOVE, SURGICAL, PROTEXIS PI , 7.5, PF, LF

## (undated) DEVICE — SUTURE, CHROMIC GUT, 3-0, SH 27"

## (undated) DEVICE — TIP, SUCTION, YANKAUER, BULB, ADULT

## (undated) DEVICE — SPONGE, GAUZE, AVANT, STERILE, NONWOVEN, 4PLY, 4 X 4, STANDARD

## (undated) DEVICE — DRAPE, SHEET, LAPAROSCOPY, 104 X 35 X 104IN, STERILE

## (undated) DEVICE — SHEARS, CURVED 9CM HARMONIC FOCUS

## (undated) DEVICE — BLADE, SURGICAL, POLYMER COATED P15, STERILE, DISP

## (undated) DEVICE — SPONGE, HEMOSTATIC, GELATIN, SURGIFOAM, 8.5 X 12 CM X 2 MM

## (undated) DEVICE — PANTY, MESH, XX-LARGE, SHORT TERM, DISP

## (undated) DEVICE — SUTURE, VICRYL, 2-0, 27 IN, SH, UNDYED

## (undated) DEVICE — TUBING, SUCTION, 6MM X 10, CLEAN N-COND

## (undated) DEVICE — DRAPE, SHEET, UTILITY, NON ABSORBENT, 18 X 26 IN, LF

## (undated) DEVICE — BONE WAX, YELLOW 2.5G